# Patient Record
Sex: FEMALE | Race: WHITE | Employment: OTHER | URBAN - METROPOLITAN AREA
[De-identification: names, ages, dates, MRNs, and addresses within clinical notes are randomized per-mention and may not be internally consistent; named-entity substitution may affect disease eponyms.]

---

## 2023-10-10 ENCOUNTER — NURSING HOME VISIT (OUTPATIENT)
Dept: POST ACUTE CARE | Facility: EXTERNAL LOCATION | Age: 83
End: 2023-10-10
Payer: MEDICARE

## 2023-10-10 DIAGNOSIS — I10 BENIGN ESSENTIAL HYPERTENSION: ICD-10-CM

## 2023-10-10 DIAGNOSIS — M15.9 OSTEOARTHRITIS, GENERALIZED: ICD-10-CM

## 2023-10-10 DIAGNOSIS — G30.1 ALZHEIMER'S DISEASE WITH LATE ONSET (MULTI): ICD-10-CM

## 2023-10-10 DIAGNOSIS — F29 PSYCHOSIS, UNSPECIFIED PSYCHOSIS TYPE (MULTI): Primary | ICD-10-CM

## 2023-10-10 DIAGNOSIS — N18.31 STAGE 3A CHRONIC KIDNEY DISEASE (MULTI): ICD-10-CM

## 2023-10-10 DIAGNOSIS — F02.80 ALZHEIMER'S DISEASE WITH LATE ONSET (MULTI): ICD-10-CM

## 2023-10-10 DIAGNOSIS — E78.2 MIXED HYPERLIPIDEMIA: ICD-10-CM

## 2023-10-10 PROCEDURE — 99308 SBSQ NF CARE LOW MDM 20: CPT | Performed by: INTERNAL MEDICINE

## 2023-10-10 NOTE — LETTER
Patient: Sarika Rivero  : 1940    Encounter Date: 10/10/2023    Subjective  Patient ID: Sarika Rivero is a 82 y.o. female who is long term resident being seen and evaluated for multiple medical problems.    Alert, pleasant, denies any pain.  She ambulates dependently.  Appetite okay.  Had no recent falls         Review of Systems   Constitutional:  Negative for fever and unexpected weight change.   HENT:  Negative for sore throat.    Respiratory:  Negative for cough and shortness of breath.    Cardiovascular:  Negative for chest pain and palpitations.   Gastrointestinal:  Negative for abdominal pain, constipation, diarrhea, nausea and vomiting.   Genitourinary:  Negative for difficulty urinating and frequency.   Musculoskeletal:  Positive for arthralgias. Negative for back pain.   Skin:  Negative for rash.   Neurological:  Negative for dizziness, seizures and headaches.   Psychiatric/Behavioral:  Negative for agitation. The patient is not nervous/anxious.        Objective  There were no vitals taken for this visit.    Physical Exam  Vitals reviewed.   Constitutional:       General: She is not in acute distress.  HENT:      Mouth/Throat:      Mouth: Mucous membranes are moist.   Cardiovascular:      Rate and Rhythm: Regular rhythm.      Heart sounds: Normal heart sounds.   Pulmonary:      Breath sounds: Normal breath sounds. No rales.   Abdominal:      Palpations: Abdomen is soft.      Tenderness: There is no abdominal tenderness.   Musculoskeletal:         General: No tenderness.      Cervical back: Neck supple. No tenderness.   Skin:     General: Skin is warm.   Neurological:      General: No focal deficit present.      Mental Status: She is alert.   Psychiatric:         Behavior: Behavior normal.         Assessment/Plan  Problem List Items Addressed This Visit       Psychosis, unspecified psychosis type (CMS/HCC) - Primary    Stage 3a chronic kidney disease (CMS/HCC)    Benign essential  hypertension     We will monitor blood pressure         Mixed hyperlipidemia    Alzheimer's disease with late onset (CMS/HCC)    Osteoarthritis, generalized     We will continue fall precautions             Goals    None           Electronically Signed By: Yadira Sanchez MD   11/6/23  9:02 AM

## 2023-11-06 PROBLEM — I10 BENIGN ESSENTIAL HYPERTENSION: Status: ACTIVE | Noted: 2023-11-06

## 2023-11-06 PROBLEM — M15.9 OSTEOARTHRITIS, GENERALIZED: Status: ACTIVE | Noted: 2023-11-06

## 2023-11-06 PROBLEM — E78.2 MIXED HYPERLIPIDEMIA: Status: ACTIVE | Noted: 2023-11-06

## 2023-11-06 PROBLEM — M54.50 LOW BACK PAIN: Status: ACTIVE | Noted: 2023-11-06

## 2023-11-06 PROBLEM — G30.1 ALZHEIMER'S DISEASE WITH LATE ONSET (MULTI): Status: ACTIVE | Noted: 2023-11-06

## 2023-11-06 PROBLEM — M17.0 PRIMARY OSTEOARTHRITIS OF BOTH KNEES: Status: ACTIVE | Noted: 2023-11-06

## 2023-11-06 PROBLEM — N18.31 STAGE 3A CHRONIC KIDNEY DISEASE (MULTI): Status: ACTIVE | Noted: 2023-11-06

## 2023-11-06 PROBLEM — I25.10 CORONARY ARTERY DISEASE INVOLVING NATIVE CORONARY ARTERY OF NATIVE HEART WITHOUT ANGINA PECTORIS: Status: ACTIVE | Noted: 2023-11-06

## 2023-11-06 PROBLEM — F33.2 MAJOR DEPRESSIVE DISORDER, RECURRENT SEVERE WITHOUT PSYCHOTIC FEATURES (MULTI): Status: ACTIVE | Noted: 2023-11-06

## 2023-11-06 PROBLEM — M85.80 OSTEOPENIA: Status: ACTIVE | Noted: 2023-11-06

## 2023-11-06 PROBLEM — K21.9 ESOPHAGEAL REFLUX: Status: ACTIVE | Noted: 2023-11-06

## 2023-11-06 PROBLEM — E78.5 HYPERLIPIDEMIA: Status: ACTIVE | Noted: 2023-11-06

## 2023-11-06 PROBLEM — F02.80 ALZHEIMER'S DISEASE WITH LATE ONSET (MULTI): Status: ACTIVE | Noted: 2023-11-06

## 2023-11-06 PROBLEM — F29 PSYCHOSIS, UNSPECIFIED PSYCHOSIS TYPE (MULTI): Status: ACTIVE | Noted: 2023-11-06

## 2023-11-06 ASSESSMENT — ENCOUNTER SYMPTOMS
CONSTIPATION: 0
SORE THROAT: 0
UNEXPECTED WEIGHT CHANGE: 0
NERVOUS/ANXIOUS: 0
COUGH: 0
AGITATION: 0
SEIZURES: 0
SHORTNESS OF BREATH: 0
ARTHRALGIAS: 1
DIARRHEA: 0
FEVER: 0
FREQUENCY: 0
VOMITING: 0
ABDOMINAL PAIN: 0
NAUSEA: 0
HEADACHES: 0
BACK PAIN: 0
DIZZINESS: 0
DIFFICULTY URINATING: 0
PALPITATIONS: 0

## 2023-11-06 NOTE — PROGRESS NOTES
Subjective   Patient ID: Sarika Rivero is a 82 y.o. female who is long term resident being seen and evaluated for multiple medical problems.    Alert, pleasant, denies any pain.  She ambulates dependently.  Appetite okay.  Had no recent falls         Review of Systems   Constitutional:  Negative for fever and unexpected weight change.   HENT:  Negative for sore throat.    Respiratory:  Negative for cough and shortness of breath.    Cardiovascular:  Negative for chest pain and palpitations.   Gastrointestinal:  Negative for abdominal pain, constipation, diarrhea, nausea and vomiting.   Genitourinary:  Negative for difficulty urinating and frequency.   Musculoskeletal:  Positive for arthralgias. Negative for back pain.   Skin:  Negative for rash.   Neurological:  Negative for dizziness, seizures and headaches.   Psychiatric/Behavioral:  Negative for agitation. The patient is not nervous/anxious.        Objective   There were no vitals taken for this visit.    Physical Exam  Vitals reviewed.   Constitutional:       General: She is not in acute distress.  HENT:      Mouth/Throat:      Mouth: Mucous membranes are moist.   Cardiovascular:      Rate and Rhythm: Regular rhythm.      Heart sounds: Normal heart sounds.   Pulmonary:      Breath sounds: Normal breath sounds. No rales.   Abdominal:      Palpations: Abdomen is soft.      Tenderness: There is no abdominal tenderness.   Musculoskeletal:         General: No tenderness.      Cervical back: Neck supple. No tenderness.   Skin:     General: Skin is warm.   Neurological:      General: No focal deficit present.      Mental Status: She is alert.   Psychiatric:         Behavior: Behavior normal.         Assessment/Plan   Problem List Items Addressed This Visit       Psychosis, unspecified psychosis type (CMS/HCC) - Primary    Stage 3a chronic kidney disease (CMS/HCC)    Benign essential hypertension     We will monitor blood pressure         Mixed hyperlipidemia     Alzheimer's disease with late onset (CMS/HCC)    Osteoarthritis, generalized     We will continue fall precautions             Goals    None

## 2023-11-07 ENCOUNTER — NURSING HOME VISIT (OUTPATIENT)
Dept: POST ACUTE CARE | Facility: EXTERNAL LOCATION | Age: 83
End: 2023-11-07
Payer: MEDICARE

## 2023-11-07 DIAGNOSIS — I10 BENIGN ESSENTIAL HYPERTENSION: Primary | ICD-10-CM

## 2023-11-07 DIAGNOSIS — N18.31 STAGE 3A CHRONIC KIDNEY DISEASE (MULTI): ICD-10-CM

## 2023-11-07 DIAGNOSIS — F02.80 ALZHEIMER'S DISEASE WITH LATE ONSET (MULTI): ICD-10-CM

## 2023-11-07 DIAGNOSIS — M15.9 OSTEOARTHRITIS, GENERALIZED: ICD-10-CM

## 2023-11-07 DIAGNOSIS — K21.9 GASTROESOPHAGEAL REFLUX DISEASE WITHOUT ESOPHAGITIS: ICD-10-CM

## 2023-11-07 DIAGNOSIS — G30.1 ALZHEIMER'S DISEASE WITH LATE ONSET (MULTI): ICD-10-CM

## 2023-11-07 PROCEDURE — 99308 SBSQ NF CARE LOW MDM 20: CPT | Performed by: INTERNAL MEDICINE

## 2023-11-07 NOTE — LETTER
Patient: Sarika Rivero  : 1940    Encounter Date: 2023    Subjective  Patient ID: Sarika Rivero is a 82 y.o. female who is long term resident being seen and evaluated for multiple medical problems.    Alert, pleasant, forgetful. denies any pain.  She ambulates dependently.  Appetite okay.  Had no recent falls         Review of Systems   Constitutional:  Negative for fever and unexpected weight change.   HENT:  Negative for sore throat.    Respiratory:  Negative for cough and shortness of breath.    Cardiovascular:  Negative for chest pain and palpitations.   Gastrointestinal:  Negative for abdominal pain, constipation, diarrhea, nausea and vomiting.   Genitourinary:  Negative for difficulty urinating and frequency.   Musculoskeletal:  Positive for arthralgias. Negative for back pain.   Skin:  Negative for rash.   Neurological:  Negative for dizziness, seizures and headaches.   Psychiatric/Behavioral:  Negative for agitation. The patient is not nervous/anxious.        Objective  There were no vitals taken for this visit.    Physical Exam  Vitals reviewed.   Constitutional:       General: She is not in acute distress.  HENT:      Mouth/Throat:      Mouth: Mucous membranes are moist.   Cardiovascular:      Rate and Rhythm: Regular rhythm.      Heart sounds: Normal heart sounds.   Pulmonary:      Breath sounds: Normal breath sounds. No rales.   Abdominal:      Palpations: Abdomen is soft.      Tenderness: There is no abdominal tenderness.   Musculoskeletal:         General: No tenderness.      Cervical back: Neck supple. No tenderness.   Neurological:      Mental Status: She is alert.         Assessment/Plan  Problem List Items Addressed This Visit       Stage 3a chronic kidney disease (CMS/HCC)    Benign essential hypertension     Will monitor BP         Esophageal reflux - Primary    Alzheimer's disease with late onset (CMS/HCC)    Osteoarthritis, generalized        Goals    None            Electronically Signed By: Yadira Sanchez MD   1/1/24 10:17 PM

## 2023-12-05 ENCOUNTER — NURSING HOME VISIT (OUTPATIENT)
Dept: POST ACUTE CARE | Facility: EXTERNAL LOCATION | Age: 83
End: 2023-12-05
Payer: MEDICARE

## 2023-12-05 DIAGNOSIS — M15.9 OSTEOARTHRITIS, GENERALIZED: Primary | ICD-10-CM

## 2023-12-05 DIAGNOSIS — G30.1 ALZHEIMER'S DISEASE WITH LATE ONSET (MULTI): ICD-10-CM

## 2023-12-05 DIAGNOSIS — F29 PSYCHOSIS, UNSPECIFIED PSYCHOSIS TYPE (MULTI): ICD-10-CM

## 2023-12-05 DIAGNOSIS — I10 BENIGN ESSENTIAL HYPERTENSION: ICD-10-CM

## 2023-12-05 DIAGNOSIS — F02.80 ALZHEIMER'S DISEASE WITH LATE ONSET (MULTI): ICD-10-CM

## 2023-12-05 PROCEDURE — 99308 SBSQ NF CARE LOW MDM 20: CPT | Performed by: INTERNAL MEDICINE

## 2023-12-05 NOTE — LETTER
Patient: Sarika Rivero  : 1940    Encounter Date: 2023    Subjective  Patient ID: Sarika Rivero is a 83 y.o. female who is long term resident being seen and evaluated for multiple medical problems.    Patient is alert and pleasant, forgetful. Denies any pain. She ambulates independently, had no recent falls. Appetite okay.  She is compliant with medications         Review of Systems   Constitutional:  Negative for fever and unexpected weight change.   HENT:  Negative for sore throat.    Respiratory:  Negative for cough and shortness of breath.    Cardiovascular:  Negative for chest pain and palpitations.   Gastrointestinal:  Negative for abdominal pain, constipation, diarrhea, nausea and vomiting.   Genitourinary:  Negative for difficulty urinating and frequency.   Musculoskeletal:  Positive for arthralgias. Negative for back pain.   Skin:  Negative for rash.   Neurological:  Negative for dizziness, seizures and headaches.   Psychiatric/Behavioral:  Negative for agitation. The patient is not nervous/anxious.        Objective  There were no vitals taken for this visit.    Physical Exam  Vitals reviewed.   Constitutional:       General: She is not in acute distress.  HENT:      Mouth/Throat:      Mouth: Mucous membranes are moist.   Cardiovascular:      Rate and Rhythm: Regular rhythm.      Heart sounds: Normal heart sounds.   Pulmonary:      Breath sounds: Normal breath sounds. No rales.   Abdominal:      Palpations: Abdomen is soft.      Tenderness: There is no abdominal tenderness.   Musculoskeletal:         General: No tenderness.      Cervical back: Neck supple. No tenderness.   Skin:     General: Skin is warm.   Neurological:      General: No focal deficit present.      Mental Status: She is alert.   Psychiatric:         Behavior: Behavior normal.         Assessment/Plan  Problem List Items Addressed This Visit       Psychosis, unspecified psychosis type (CMS/HCC)    Benign essential  hypertension - Primary     Will monitor BP         Alzheimer's disease with late onset (CMS/HCC)    Osteoarthritis, generalized        Goals    None     Will continue fall precautions, supportive care      Electronically Signed By: Yadira Sanchez MD   1/19/24  7:09 AM

## 2023-12-12 ASSESSMENT — ENCOUNTER SYMPTOMS
COUGH: 0
SORE THROAT: 0
DIARRHEA: 0
VOMITING: 0
FEVER: 0
SEIZURES: 0
SHORTNESS OF BREATH: 0
ARTHRALGIAS: 1
AGITATION: 0
BACK PAIN: 0
PALPITATIONS: 0
DIFFICULTY URINATING: 0
UNEXPECTED WEIGHT CHANGE: 0
CONSTIPATION: 0
NAUSEA: 0
NERVOUS/ANXIOUS: 0
HEADACHES: 0
DIZZINESS: 0
FREQUENCY: 0
ABDOMINAL PAIN: 0

## 2023-12-12 NOTE — PROGRESS NOTES
Subjective   Patient ID: Sarika Rivero is a 82 y.o. female who is long term resident being seen and evaluated for multiple medical problems.    Alert, pleasant, forgetful. denies any pain.  She ambulates dependently.  Appetite okay.  Had no recent falls         Review of Systems   Constitutional:  Negative for fever and unexpected weight change.   HENT:  Negative for sore throat.    Respiratory:  Negative for cough and shortness of breath.    Cardiovascular:  Negative for chest pain and palpitations.   Gastrointestinal:  Negative for abdominal pain, constipation, diarrhea, nausea and vomiting.   Genitourinary:  Negative for difficulty urinating and frequency.   Musculoskeletal:  Positive for arthralgias. Negative for back pain.   Skin:  Negative for rash.   Neurological:  Negative for dizziness, seizures and headaches.   Psychiatric/Behavioral:  Negative for agitation. The patient is not nervous/anxious.        Objective   There were no vitals taken for this visit.    Physical Exam  Vitals reviewed.   Constitutional:       General: She is not in acute distress.  HENT:      Mouth/Throat:      Mouth: Mucous membranes are moist.   Cardiovascular:      Rate and Rhythm: Regular rhythm.      Heart sounds: Normal heart sounds.   Pulmonary:      Breath sounds: Normal breath sounds. No rales.   Abdominal:      Palpations: Abdomen is soft.      Tenderness: There is no abdominal tenderness.   Musculoskeletal:         General: No tenderness.      Cervical back: Neck supple. No tenderness.   Neurological:      Mental Status: She is alert.         Assessment/Plan   Problem List Items Addressed This Visit       Stage 3a chronic kidney disease (CMS/HCC)    Benign essential hypertension     Will monitor BP         Esophageal reflux - Primary    Alzheimer's disease with late onset (CMS/HCC)    Osteoarthritis, generalized        Goals    None

## 2023-12-15 ENCOUNTER — NURSING HOME VISIT (OUTPATIENT)
Dept: POST ACUTE CARE | Facility: EXTERNAL LOCATION | Age: 83
End: 2023-12-15
Payer: MEDICARE

## 2023-12-15 DIAGNOSIS — F29 PSYCHOSIS, UNSPECIFIED PSYCHOSIS TYPE (MULTI): ICD-10-CM

## 2023-12-15 DIAGNOSIS — M15.9 OSTEOARTHRITIS, GENERALIZED: ICD-10-CM

## 2023-12-15 DIAGNOSIS — I10 BENIGN ESSENTIAL HYPERTENSION: ICD-10-CM

## 2023-12-15 DIAGNOSIS — K21.9 GASTROESOPHAGEAL REFLUX DISEASE WITHOUT ESOPHAGITIS: ICD-10-CM

## 2023-12-15 DIAGNOSIS — G30.1 ALZHEIMER'S DISEASE WITH LATE ONSET (MULTI): ICD-10-CM

## 2023-12-15 DIAGNOSIS — J20.8 ACUTE BRONCHITIS DUE TO OTHER SPECIFIED ORGANISMS: Primary | ICD-10-CM

## 2023-12-15 DIAGNOSIS — F02.80 ALZHEIMER'S DISEASE WITH LATE ONSET (MULTI): ICD-10-CM

## 2023-12-15 PROCEDURE — 99309 SBSQ NF CARE MODERATE MDM 30: CPT | Performed by: INTERNAL MEDICINE

## 2023-12-15 NOTE — LETTER
Patient: Sarika Rivero  : 1940    Encounter Date: 12/15/2023    Subjective  Patient ID: Sarika Rivero is a 83 y.o. female who is long term resident being seen and evaluated for multiple medical problems.    Patient is alert and pleasant, she is noted to have a productive cough, denies any pain and shortness of breath. She ambulates dependently. Appetite okay.          Review of Systems   Constitutional:  Negative for fever and unexpected weight change.   HENT:  Negative for sore throat.    Respiratory:  Positive for cough. Negative for shortness of breath.    Cardiovascular:  Negative for chest pain and palpitations.   Gastrointestinal:  Negative for abdominal pain, constipation, diarrhea, nausea and vomiting.   Genitourinary:  Negative for difficulty urinating and frequency.   Musculoskeletal:  Positive for arthralgias. Negative for back pain.   Skin:  Negative for rash.   Neurological:  Negative for dizziness, seizures and headaches.   Psychiatric/Behavioral:  Negative for agitation. The patient is not nervous/anxious.        Objective  There were no vitals taken for this visit.    Physical Exam  Vitals reviewed.   Constitutional:       General: She is not in acute distress.  HENT:      Mouth/Throat:      Mouth: Mucous membranes are moist.   Cardiovascular:      Rate and Rhythm: Regular rhythm.      Heart sounds: Normal heart sounds.   Pulmonary:      Breath sounds: Normal breath sounds. No rales.   Abdominal:      Palpations: Abdomen is soft.      Tenderness: There is no abdominal tenderness.   Musculoskeletal:         General: No tenderness.      Cervical back: Neck supple. No tenderness.   Skin:     General: Skin is warm.   Neurological:      General: No focal deficit present.      Mental Status: She is alert.   Psychiatric:         Behavior: Behavior normal.         Assessment/Plan  Problem List Items Addressed This Visit       Psychosis, unspecified psychosis type (CMS/HCC)    Benign  essential hypertension     Will monitor BP         Esophageal reflux    Alzheimer's disease with late onset (CMS/HCC)    Osteoarthritis, generalized     Other Visit Diagnoses       Acute bronchitis due to other specified organisms    -  Primary             Goals    None     Will test for COVID, start Mucinex, monitor temperature      Electronically Signed By: Yadira Sanchez MD   1/22/24  9:04 AM

## 2023-12-27 ASSESSMENT — ENCOUNTER SYMPTOMS
FREQUENCY: 0
DIZZINESS: 0
HEADACHES: 0
NAUSEA: 0
NERVOUS/ANXIOUS: 0
DIZZINESS: 0
VOMITING: 0
PALPITATIONS: 0
SORE THROAT: 0
COUGH: 0
VOMITING: 0
DIARRHEA: 0
DIARRHEA: 0
SHORTNESS OF BREATH: 0
UNEXPECTED WEIGHT CHANGE: 0
SEIZURES: 0
NAUSEA: 0
SHORTNESS OF BREATH: 0
AGITATION: 0
PALPITATIONS: 0
UNEXPECTED WEIGHT CHANGE: 0
BACK PAIN: 0
HEADACHES: 0
DIFFICULTY URINATING: 0
SEIZURES: 0
FREQUENCY: 0
BACK PAIN: 0
ABDOMINAL PAIN: 0
SORE THROAT: 0
FEVER: 0
ARTHRALGIAS: 1
FEVER: 0
ABDOMINAL PAIN: 0
CONSTIPATION: 0
NERVOUS/ANXIOUS: 0
ARTHRALGIAS: 1
CONSTIPATION: 0
DIFFICULTY URINATING: 0
AGITATION: 0

## 2023-12-27 NOTE — PROGRESS NOTES
Subjective   Patient ID: Sarika Rivero is a 83 y.o. female who is long term resident being seen and evaluated for multiple medical problems.    Patient is alert and pleasant, forgetful. Denies any pain. She ambulates independently, had no recent falls. Appetite okay.  She is compliant with medications         Review of Systems   Constitutional:  Negative for fever and unexpected weight change.   HENT:  Negative for sore throat.    Respiratory:  Negative for cough and shortness of breath.    Cardiovascular:  Negative for chest pain and palpitations.   Gastrointestinal:  Negative for abdominal pain, constipation, diarrhea, nausea and vomiting.   Genitourinary:  Negative for difficulty urinating and frequency.   Musculoskeletal:  Positive for arthralgias. Negative for back pain.   Skin:  Negative for rash.   Neurological:  Negative for dizziness, seizures and headaches.   Psychiatric/Behavioral:  Negative for agitation. The patient is not nervous/anxious.        Objective   There were no vitals taken for this visit.    Physical Exam  Vitals reviewed.   Constitutional:       General: She is not in acute distress.  HENT:      Mouth/Throat:      Mouth: Mucous membranes are moist.   Cardiovascular:      Rate and Rhythm: Regular rhythm.      Heart sounds: Normal heart sounds.   Pulmonary:      Breath sounds: Normal breath sounds. No rales.   Abdominal:      Palpations: Abdomen is soft.      Tenderness: There is no abdominal tenderness.   Musculoskeletal:         General: No tenderness.      Cervical back: Neck supple. No tenderness.   Skin:     General: Skin is warm.   Neurological:      General: No focal deficit present.      Mental Status: She is alert.   Psychiatric:         Behavior: Behavior normal.         Assessment/Plan   Problem List Items Addressed This Visit       Psychosis, unspecified psychosis type (CMS/HCC)    Benign essential hypertension - Primary     Will monitor BP         Alzheimer's disease with  late onset (CMS/[facility])    Osteoarthritis, generalized        Goals    None     Will continue fall precautions, supportive care

## 2023-12-27 NOTE — PROGRESS NOTES
Subjective   Patient ID: Sarika Rivero is a 83 y.o. female who is long term resident being seen and evaluated for multiple medical problems.    Patient is alert and pleasant, she is noted to have a productive cough, denies any pain and shortness of breath. She ambulates dependently. Appetite okay.          Review of Systems   Constitutional:  Negative for fever and unexpected weight change.   HENT:  Negative for sore throat.    Respiratory:  Positive for cough. Negative for shortness of breath.    Cardiovascular:  Negative for chest pain and palpitations.   Gastrointestinal:  Negative for abdominal pain, constipation, diarrhea, nausea and vomiting.   Genitourinary:  Negative for difficulty urinating and frequency.   Musculoskeletal:  Positive for arthralgias. Negative for back pain.   Skin:  Negative for rash.   Neurological:  Negative for dizziness, seizures and headaches.   Psychiatric/Behavioral:  Negative for agitation. The patient is not nervous/anxious.        Objective   There were no vitals taken for this visit.    Physical Exam  Vitals reviewed.   Constitutional:       General: She is not in acute distress.  HENT:      Mouth/Throat:      Mouth: Mucous membranes are moist.   Cardiovascular:      Rate and Rhythm: Regular rhythm.      Heart sounds: Normal heart sounds.   Pulmonary:      Breath sounds: Normal breath sounds. No rales.   Abdominal:      Palpations: Abdomen is soft.      Tenderness: There is no abdominal tenderness.   Musculoskeletal:         General: No tenderness.      Cervical back: Neck supple. No tenderness.   Skin:     General: Skin is warm.   Neurological:      General: No focal deficit present.      Mental Status: She is alert.   Psychiatric:         Behavior: Behavior normal.         Assessment/Plan   Problem List Items Addressed This Visit       Psychosis, unspecified psychosis type (CMS/HCC)    Benign essential hypertension     Will monitor BP         Esophageal reflux     Alzheimer's disease with late onset (CMS/[facility])    Osteoarthritis, generalized     Other Visit Diagnoses       Acute bronchitis due to other specified organisms    -  Primary             Goals    None     Will test for COVID, start Mucinex, monitor temperature

## 2024-01-16 ENCOUNTER — NURSING HOME VISIT (OUTPATIENT)
Dept: POST ACUTE CARE | Facility: EXTERNAL LOCATION | Age: 84
End: 2024-01-16
Payer: MEDICARE

## 2024-01-16 DIAGNOSIS — F02.80 ALZHEIMER'S DISEASE WITH LATE ONSET (MULTI): ICD-10-CM

## 2024-01-16 DIAGNOSIS — F29 PSYCHOSIS, UNSPECIFIED PSYCHOSIS TYPE (MULTI): ICD-10-CM

## 2024-01-16 DIAGNOSIS — G30.1 ALZHEIMER'S DISEASE WITH LATE ONSET (MULTI): ICD-10-CM

## 2024-01-16 DIAGNOSIS — I10 BENIGN ESSENTIAL HYPERTENSION: ICD-10-CM

## 2024-01-16 DIAGNOSIS — M15.9 OSTEOARTHRITIS, GENERALIZED: Primary | ICD-10-CM

## 2024-01-16 PROCEDURE — 99308 SBSQ NF CARE LOW MDM 20: CPT | Performed by: INTERNAL MEDICINE

## 2024-01-16 NOTE — LETTER
Patient: Sarika Rivero  : 1940    Encounter Date: 2024    Subjective  Patient ID: Sarika Rivero is a 83 y.o. female who is long term resident being seen and evaluated for multiple medical problems.    Patient is alert and pleasant, denies any pain and shortness of breath. She ambulates with walker, had no recent falls. Appetite okay.          Review of Systems   Constitutional:  Negative for fever and unexpected weight change.   HENT:  Negative for sore throat.    Respiratory:  Negative for cough and shortness of breath.    Cardiovascular:  Negative for chest pain and palpitations.   Gastrointestinal:  Negative for abdominal pain, constipation, diarrhea, nausea and vomiting.   Genitourinary:  Negative for difficulty urinating and frequency.   Musculoskeletal:  Positive for arthralgias. Negative for back pain.   Skin:  Negative for rash.   Neurological:  Negative for dizziness, seizures and headaches.   Psychiatric/Behavioral:  Negative for agitation. The patient is not nervous/anxious.        Objective  There were no vitals taken for this visit.    Physical Exam  Vitals reviewed.   Constitutional:       General: She is not in acute distress.  HENT:      Mouth/Throat:      Mouth: Mucous membranes are moist.   Cardiovascular:      Rate and Rhythm: Regular rhythm.      Heart sounds: Normal heart sounds.   Pulmonary:      Breath sounds: Normal breath sounds. No rales.   Abdominal:      Palpations: Abdomen is soft.      Tenderness: There is no abdominal tenderness.   Musculoskeletal:         General: No tenderness.      Cervical back: Neck supple. No tenderness.   Skin:     General: Skin is warm.   Neurological:      General: No focal deficit present.      Mental Status: She is alert.   Psychiatric:         Behavior: Behavior normal.         Cognition and Memory: Memory is impaired.         Assessment/Plan  Problem List Items Addressed This Visit       Psychosis, unspecified psychosis type  (CMS/HCC)    Benign essential hypertension     Will monitor BP         Alzheimer's disease with late onset (CMS/HCC)    Osteoarthritis, generalized - Primary        Goals    None     Continue fall precautions      Electronically Signed By: Yadira Sanchez MD   3/18/24  8:37 AM

## 2024-01-22 ASSESSMENT — ENCOUNTER SYMPTOMS: COUGH: 1

## 2024-02-06 ENCOUNTER — NURSING HOME VISIT (OUTPATIENT)
Dept: POST ACUTE CARE | Facility: EXTERNAL LOCATION | Age: 84
End: 2024-02-06
Payer: MEDICARE

## 2024-02-06 DIAGNOSIS — G30.1 ALZHEIMER'S DISEASE WITH LATE ONSET (MULTI): ICD-10-CM

## 2024-02-06 DIAGNOSIS — F02.80 ALZHEIMER'S DISEASE WITH LATE ONSET (MULTI): ICD-10-CM

## 2024-02-06 DIAGNOSIS — I10 BENIGN ESSENTIAL HYPERTENSION: Primary | ICD-10-CM

## 2024-02-06 DIAGNOSIS — F29 PSYCHOSIS, UNSPECIFIED PSYCHOSIS TYPE (MULTI): ICD-10-CM

## 2024-02-06 DIAGNOSIS — M15.9 OSTEOARTHRITIS, GENERALIZED: ICD-10-CM

## 2024-02-06 PROCEDURE — 99308 SBSQ NF CARE LOW MDM 20: CPT | Performed by: INTERNAL MEDICINE

## 2024-02-06 NOTE — LETTER
Patient: Sarika Rivero  : 1940    Encounter Date: 2024    Subjective  Patient ID: Sarika Rivero is a 83 y.o. female who is long term resident being seen and evaluated for multiple medical problems.    Alert and pleasant, no respiratory distress noted. She ambulates with walker, no she is compliant with medication recent falls. Appetite okay.          Review of Systems   Constitutional:  Negative for fever and unexpected weight change.   HENT:  Negative for sore throat.    Respiratory:  Negative for cough and shortness of breath.    Cardiovascular:  Negative for chest pain and palpitations.   Gastrointestinal:  Negative for abdominal pain, constipation, diarrhea, nausea and vomiting.   Genitourinary:  Negative for difficulty urinating and frequency.   Musculoskeletal:  Positive for arthralgias. Negative for back pain.   Skin:  Negative for rash.   Neurological:  Negative for dizziness, seizures and headaches.   Psychiatric/Behavioral:  Negative for agitation. The patient is not nervous/anxious.        Objective  There were no vitals taken for this visit.    Physical Exam  Vitals reviewed.   Constitutional:       General: She is not in acute distress.  HENT:      Mouth/Throat:      Mouth: Mucous membranes are moist.   Cardiovascular:      Rate and Rhythm: Regular rhythm.      Heart sounds: Normal heart sounds.   Pulmonary:      Breath sounds: Normal breath sounds. No rales.   Abdominal:      Palpations: Abdomen is soft.      Tenderness: There is no abdominal tenderness.   Musculoskeletal:         General: No tenderness.      Cervical back: Neck supple. No tenderness.   Skin:     General: Skin is warm.   Neurological:      General: No focal deficit present.      Mental Status: She is alert.   Psychiatric:         Behavior: Behavior normal.         Cognition and Memory: Memory is impaired.         Assessment/Plan  Problem List Items Addressed This Visit       Psychosis, unspecified psychosis type  (CMS/HCC)    Benign essential hypertension - Primary     Will monitor BP         Alzheimer's disease with late onset (CMS/[facility])    Osteoarthritis, generalized        Goals    None     Will continue fall precautions      Electronically Signed By: Yadira Sanchez MD   3/18/24  8:38 AM

## 2024-02-12 ASSESSMENT — ENCOUNTER SYMPTOMS
HEADACHES: 0
NAUSEA: 0
CONSTIPATION: 0
ARTHRALGIAS: 1
DIFFICULTY URINATING: 0
UNEXPECTED WEIGHT CHANGE: 0
SORE THROAT: 0
AGITATION: 0
DIZZINESS: 0
FEVER: 0
FREQUENCY: 0
DIARRHEA: 0
ABDOMINAL PAIN: 0
COUGH: 0
SHORTNESS OF BREATH: 0
NERVOUS/ANXIOUS: 0
PALPITATIONS: 0
SEIZURES: 0
VOMITING: 0
BACK PAIN: 0

## 2024-02-12 NOTE — PROGRESS NOTES
Subjective   Patient ID: Sarika Rivero is a 83 y.o. female who is long term resident being seen and evaluated for multiple medical problems.    Patient is alert and pleasant, denies any pain and shortness of breath. She ambulates with walker, had no recent falls. Appetite okay.          Review of Systems   Constitutional:  Negative for fever and unexpected weight change.   HENT:  Negative for sore throat.    Respiratory:  Negative for cough and shortness of breath.    Cardiovascular:  Negative for chest pain and palpitations.   Gastrointestinal:  Negative for abdominal pain, constipation, diarrhea, nausea and vomiting.   Genitourinary:  Negative for difficulty urinating and frequency.   Musculoskeletal:  Positive for arthralgias. Negative for back pain.   Skin:  Negative for rash.   Neurological:  Negative for dizziness, seizures and headaches.   Psychiatric/Behavioral:  Negative for agitation. The patient is not nervous/anxious.        Objective   There were no vitals taken for this visit.    Physical Exam  Vitals reviewed.   Constitutional:       General: She is not in acute distress.  HENT:      Mouth/Throat:      Mouth: Mucous membranes are moist.   Cardiovascular:      Rate and Rhythm: Regular rhythm.      Heart sounds: Normal heart sounds.   Pulmonary:      Breath sounds: Normal breath sounds. No rales.   Abdominal:      Palpations: Abdomen is soft.      Tenderness: There is no abdominal tenderness.   Musculoskeletal:         General: No tenderness.      Cervical back: Neck supple. No tenderness.   Skin:     General: Skin is warm.   Neurological:      General: No focal deficit present.      Mental Status: She is alert.   Psychiatric:         Behavior: Behavior normal.         Cognition and Memory: Memory is impaired.         Assessment/Plan   Problem List Items Addressed This Visit       Psychosis, unspecified psychosis type (CMS/HCC)    Benign essential hypertension     Will monitor BP         Alzheimer's  disease with late onset (CMS/HCC)    Osteoarthritis, generalized - Primary        Goals    None     Continue fall precautions

## 2024-02-20 ASSESSMENT — ENCOUNTER SYMPTOMS
ABDOMINAL PAIN: 0
DIFFICULTY URINATING: 0
DIARRHEA: 0
SEIZURES: 0
CONSTIPATION: 0
UNEXPECTED WEIGHT CHANGE: 0
PALPITATIONS: 0
VOMITING: 0
SORE THROAT: 0
NERVOUS/ANXIOUS: 0
FEVER: 0
SHORTNESS OF BREATH: 0
DIZZINESS: 0
AGITATION: 0
BACK PAIN: 0
ARTHRALGIAS: 1
HEADACHES: 0
FREQUENCY: 0
COUGH: 0
NAUSEA: 0

## 2024-02-20 NOTE — PROGRESS NOTES
Subjective   Patient ID: Sarika Rivero is a 83 y.o. female who is long term resident being seen and evaluated for multiple medical problems.    Alert and pleasant, no respiratory distress noted. She ambulates with walker, no she is compliant with medication recent falls. Appetite okay.          Review of Systems   Constitutional:  Negative for fever and unexpected weight change.   HENT:  Negative for sore throat.    Respiratory:  Negative for cough and shortness of breath.    Cardiovascular:  Negative for chest pain and palpitations.   Gastrointestinal:  Negative for abdominal pain, constipation, diarrhea, nausea and vomiting.   Genitourinary:  Negative for difficulty urinating and frequency.   Musculoskeletal:  Positive for arthralgias. Negative for back pain.   Skin:  Negative for rash.   Neurological:  Negative for dizziness, seizures and headaches.   Psychiatric/Behavioral:  Negative for agitation. The patient is not nervous/anxious.        Objective   There were no vitals taken for this visit.    Physical Exam  Vitals reviewed.   Constitutional:       General: She is not in acute distress.  HENT:      Mouth/Throat:      Mouth: Mucous membranes are moist.   Cardiovascular:      Rate and Rhythm: Regular rhythm.      Heart sounds: Normal heart sounds.   Pulmonary:      Breath sounds: Normal breath sounds. No rales.   Abdominal:      Palpations: Abdomen is soft.      Tenderness: There is no abdominal tenderness.   Musculoskeletal:         General: No tenderness.      Cervical back: Neck supple. No tenderness.   Skin:     General: Skin is warm.   Neurological:      General: No focal deficit present.      Mental Status: She is alert.   Psychiatric:         Behavior: Behavior normal.         Cognition and Memory: Memory is impaired.         Assessment/Plan   Problem List Items Addressed This Visit       Psychosis, unspecified psychosis type (CMS/HCC)    Benign essential hypertension - Primary     Will monitor BP          Alzheimer's disease with late onset (CMS/HCC)    Osteoarthritis, generalized        Goals    None     Will continue fall precautions

## 2024-03-05 ENCOUNTER — NURSING HOME VISIT (OUTPATIENT)
Dept: POST ACUTE CARE | Facility: EXTERNAL LOCATION | Age: 84
End: 2024-03-05
Payer: MEDICARE

## 2024-03-05 DIAGNOSIS — I10 BENIGN ESSENTIAL HYPERTENSION: ICD-10-CM

## 2024-03-05 DIAGNOSIS — G30.1 ALZHEIMER'S DISEASE WITH LATE ONSET (MULTI): ICD-10-CM

## 2024-03-05 DIAGNOSIS — K21.9 GASTROESOPHAGEAL REFLUX DISEASE WITHOUT ESOPHAGITIS: Primary | ICD-10-CM

## 2024-03-05 DIAGNOSIS — F02.80 ALZHEIMER'S DISEASE WITH LATE ONSET (MULTI): ICD-10-CM

## 2024-03-05 DIAGNOSIS — F33.2 MAJOR DEPRESSIVE DISORDER, RECURRENT SEVERE WITHOUT PSYCHOTIC FEATURES (MULTI): ICD-10-CM

## 2024-03-05 DIAGNOSIS — M15.9 OSTEOARTHRITIS, GENERALIZED: ICD-10-CM

## 2024-03-05 PROCEDURE — 99308 SBSQ NF CARE LOW MDM 20: CPT | Performed by: INTERNAL MEDICINE

## 2024-03-05 NOTE — LETTER
Patient: Sarika Rivero  : 1940    Encounter Date: 2024    Subjective  Patient ID: Sarika Rivero is a 83 y.o. female who is long term resident being seen and evaluated for multiple medical problems.    Alert and pleasant. Denies pain, no respiratory distress noted. She ambulates with walker, no she is compliant with medication recent falls. Appetite okay.          Review of Systems   Constitutional:  Negative for fever and unexpected weight change.   HENT:  Negative for sore throat.    Respiratory:  Negative for cough and shortness of breath.    Cardiovascular:  Negative for chest pain and palpitations.   Gastrointestinal:  Negative for abdominal pain, constipation, diarrhea, nausea and vomiting.   Genitourinary:  Negative for difficulty urinating and frequency.   Musculoskeletal:  Positive for arthralgias. Negative for back pain.   Skin:  Negative for rash.   Neurological:  Negative for dizziness, seizures and headaches.   Psychiatric/Behavioral:  Negative for agitation. The patient is not nervous/anxious.        Objective  There were no vitals taken for this visit.    Physical Exam  Vitals reviewed.   Constitutional:       General: She is not in acute distress.  HENT:      Mouth/Throat:      Mouth: Mucous membranes are moist.   Cardiovascular:      Rate and Rhythm: Regular rhythm.      Heart sounds: Normal heart sounds.   Pulmonary:      Breath sounds: Normal breath sounds. No rales.   Abdominal:      Palpations: Abdomen is soft.      Tenderness: There is no abdominal tenderness.   Musculoskeletal:         General: No tenderness.      Cervical back: Neck supple. No tenderness.   Skin:     General: Skin is warm.   Neurological:      General: No focal deficit present.      Mental Status: She is alert.   Psychiatric:         Behavior: Behavior normal.         Cognition and Memory: Memory is impaired.         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

## 2024-03-25 ASSESSMENT — ENCOUNTER SYMPTOMS
FREQUENCY: 0
UNEXPECTED WEIGHT CHANGE: 0
BACK PAIN: 0
HEADACHES: 0
ABDOMINAL PAIN: 0
NAUSEA: 0
SORE THROAT: 0
PALPITATIONS: 0
ARTHRALGIAS: 1
DIFFICULTY URINATING: 0
FEVER: 0
DIZZINESS: 0
SHORTNESS OF BREATH: 0
COUGH: 0
SEIZURES: 0
NERVOUS/ANXIOUS: 0
VOMITING: 0
AGITATION: 0
DIARRHEA: 0
CONSTIPATION: 0

## 2024-03-25 NOTE — PROGRESS NOTES
Subjective   Patient ID: Sarika Rivero is a 83 y.o. female who is long term resident being seen and evaluated for multiple medical problems.    Alert and pleasant. Denies pain, no respiratory distress noted. She ambulates with walker, no she is compliant with medication recent falls. Appetite okay.          Review of Systems   Constitutional:  Negative for fever and unexpected weight change.   HENT:  Negative for sore throat.    Respiratory:  Negative for cough and shortness of breath.    Cardiovascular:  Negative for chest pain and palpitations.   Gastrointestinal:  Negative for abdominal pain, constipation, diarrhea, nausea and vomiting.   Genitourinary:  Negative for difficulty urinating and frequency.   Musculoskeletal:  Positive for arthralgias. Negative for back pain.   Skin:  Negative for rash.   Neurological:  Negative for dizziness, seizures and headaches.   Psychiatric/Behavioral:  Negative for agitation. The patient is not nervous/anxious.        Objective   There were no vitals taken for this visit.    Physical Exam  Vitals reviewed.   Constitutional:       General: She is not in acute distress.  HENT:      Mouth/Throat:      Mouth: Mucous membranes are moist.   Cardiovascular:      Rate and Rhythm: Regular rhythm.      Heart sounds: Normal heart sounds.   Pulmonary:      Breath sounds: Normal breath sounds. No rales.   Abdominal:      Palpations: Abdomen is soft.      Tenderness: There is no abdominal tenderness.   Musculoskeletal:         General: No tenderness.      Cervical back: Neck supple. No tenderness.   Skin:     General: Skin is warm.   Neurological:      General: No focal deficit present.      Mental Status: She is alert.   Psychiatric:         Behavior: Behavior normal.         Cognition and Memory: Memory is impaired.         Assessment/Plan   Problem List Items Addressed This Visit       Benign essential hypertension     Will monitor BP         Esophageal reflux - Primary    Major  depressive disorder, recurrent severe without psychotic features (CMS/HCC)    Alzheimer's disease with late onset (CMS/HCC)    Osteoarthritis, generalized        Goals    None     Will continue fall precautions

## 2024-04-02 ENCOUNTER — NURSING HOME VISIT (OUTPATIENT)
Dept: POST ACUTE CARE | Facility: EXTERNAL LOCATION | Age: 84
End: 2024-04-02
Payer: MEDICARE

## 2024-04-02 DIAGNOSIS — G30.1 ALZHEIMER'S DISEASE WITH LATE ONSET (MULTI): ICD-10-CM

## 2024-04-02 DIAGNOSIS — M15.9 OSTEOARTHRITIS, GENERALIZED: ICD-10-CM

## 2024-04-02 DIAGNOSIS — F02.80 ALZHEIMER'S DISEASE WITH LATE ONSET (MULTI): ICD-10-CM

## 2024-04-02 DIAGNOSIS — K21.9 GASTROESOPHAGEAL REFLUX DISEASE WITHOUT ESOPHAGITIS: ICD-10-CM

## 2024-04-02 DIAGNOSIS — I10 BENIGN ESSENTIAL HYPERTENSION: Primary | ICD-10-CM

## 2024-04-02 PROCEDURE — 99308 SBSQ NF CARE LOW MDM 20: CPT | Performed by: INTERNAL MEDICINE

## 2024-04-02 NOTE — LETTER
Patient: Sarika Rivero  : 1940    Encounter Date: 2024    Subjective  Patient ID: Sarika Rivero is a 83 y.o. female who is long term resident being seen and evaluated for multiple medical problems.    Alert and pleasant. Denies pain and shortness of breath. She ambulates short distances with walker. Denies recent falls. She is compliant with medication.         Review of Systems   Constitutional:  Negative for fever and unexpected weight change.   HENT:  Negative for sore throat.    Respiratory:  Negative for cough and shortness of breath.    Cardiovascular:  Negative for chest pain and palpitations.   Gastrointestinal:  Negative for abdominal pain, constipation, diarrhea, nausea and vomiting.   Genitourinary:  Negative for difficulty urinating and frequency.   Musculoskeletal:  Positive for arthralgias. Negative for back pain.   Skin:  Negative for rash.   Neurological:  Negative for dizziness, seizures and headaches.   Psychiatric/Behavioral:  Negative for agitation. The patient is not nervous/anxious.        Objective  There were no vitals taken for this visit.    Physical Exam  Vitals reviewed.   Constitutional:       General: She is not in acute distress.  HENT:      Mouth/Throat:      Mouth: Mucous membranes are moist.   Cardiovascular:      Rate and Rhythm: Regular rhythm.      Heart sounds: Normal heart sounds.   Pulmonary:      Breath sounds: Normal breath sounds. No rales.   Abdominal:      Palpations: Abdomen is soft.      Tenderness: There is no abdominal tenderness.   Musculoskeletal:         General: No tenderness.      Cervical back: Neck supple. No tenderness.   Skin:     General: Skin is warm.   Neurological:      Mental Status: She is alert.         Assessment/Plan  Problem List Items Addressed This Visit       Benign essential hypertension - Primary     Will monitor BP         Esophageal reflux    Alzheimer's disease with late onset (Multi)    Osteoarthritis, generalized         Goals    None     Will continue fall precautions      Electronically Signed By: Yadira Sanchez MD   5/19/24  8:04 PM

## 2024-04-16 ASSESSMENT — ENCOUNTER SYMPTOMS
COUGH: 0
AGITATION: 0
FREQUENCY: 0
ABDOMINAL PAIN: 0
ARTHRALGIAS: 1
SORE THROAT: 0
FEVER: 0
NERVOUS/ANXIOUS: 0
CONSTIPATION: 0
HEADACHES: 0
UNEXPECTED WEIGHT CHANGE: 0
NAUSEA: 0
SHORTNESS OF BREATH: 0
SEIZURES: 0
DIARRHEA: 0
VOMITING: 0
DIZZINESS: 0
DIFFICULTY URINATING: 0
BACK PAIN: 0
PALPITATIONS: 0

## 2024-04-16 NOTE — PROGRESS NOTES
Subjective   Patient ID: Sarika Rivero is a 83 y.o. female who is long term resident being seen and evaluated for multiple medical problems.    Alert and pleasant. Denies pain and shortness of breath. She ambulates short distances with walker. Denies recent falls. She is compliant with medication.         Review of Systems   Constitutional:  Negative for fever and unexpected weight change.   HENT:  Negative for sore throat.    Respiratory:  Negative for cough and shortness of breath.    Cardiovascular:  Negative for chest pain and palpitations.   Gastrointestinal:  Negative for abdominal pain, constipation, diarrhea, nausea and vomiting.   Genitourinary:  Negative for difficulty urinating and frequency.   Musculoskeletal:  Positive for arthralgias. Negative for back pain.   Skin:  Negative for rash.   Neurological:  Negative for dizziness, seizures and headaches.   Psychiatric/Behavioral:  Negative for agitation. The patient is not nervous/anxious.        Objective   There were no vitals taken for this visit.    Physical Exam  Vitals reviewed.   Constitutional:       General: She is not in acute distress.  HENT:      Mouth/Throat:      Mouth: Mucous membranes are moist.   Cardiovascular:      Rate and Rhythm: Regular rhythm.      Heart sounds: Normal heart sounds.   Pulmonary:      Breath sounds: Normal breath sounds. No rales.   Abdominal:      Palpations: Abdomen is soft.      Tenderness: There is no abdominal tenderness.   Musculoskeletal:         General: No tenderness.      Cervical back: Neck supple. No tenderness.   Skin:     General: Skin is warm.   Neurological:      Mental Status: She is alert.         Assessment/Plan   Problem List Items Addressed This Visit       Benign essential hypertension - Primary     Will monitor BP         Esophageal reflux    Alzheimer's disease with late onset (Multi)    Osteoarthritis, generalized        Goals    None     Will continue fall precautions

## 2024-05-07 ENCOUNTER — NURSING HOME VISIT (OUTPATIENT)
Dept: POST ACUTE CARE | Facility: EXTERNAL LOCATION | Age: 84
End: 2024-05-07
Payer: MEDICARE

## 2024-05-07 DIAGNOSIS — M15.9 OSTEOARTHRITIS, GENERALIZED: Primary | ICD-10-CM

## 2024-05-07 DIAGNOSIS — I10 BENIGN ESSENTIAL HYPERTENSION: ICD-10-CM

## 2024-05-07 DIAGNOSIS — G30.1 ALZHEIMER'S DISEASE WITH LATE ONSET (MULTI): ICD-10-CM

## 2024-05-07 DIAGNOSIS — F02.80 ALZHEIMER'S DISEASE WITH LATE ONSET (MULTI): ICD-10-CM

## 2024-05-07 PROCEDURE — 99308 SBSQ NF CARE LOW MDM 20: CPT | Performed by: INTERNAL MEDICINE

## 2024-05-07 NOTE — LETTER
Patient: Sarika Rivero  : 1940    Encounter Date: 2024    Subjective  Patient ID: Sarika Rivero is a 83 y.o. female who is long term resident being seen and evaluated for multiple medical problems.    Alert and pleasant. Denies pain. She ambulates short distances with walker. Denies recent falls. She is compliant with medication.         Review of Systems   Constitutional:  Negative for fever and unexpected weight change.   HENT:  Negative for sore throat.    Respiratory:  Negative for cough and shortness of breath.    Cardiovascular:  Negative for chest pain and palpitations.   Gastrointestinal:  Negative for abdominal pain, constipation, diarrhea, nausea and vomiting.   Genitourinary:  Negative for difficulty urinating and frequency.   Musculoskeletal:  Positive for arthralgias. Negative for back pain.   Skin:  Negative for rash.   Neurological:  Negative for dizziness, seizures and headaches.   Psychiatric/Behavioral:  Negative for agitation. The patient is not nervous/anxious.        Objective  There were no vitals taken for this visit.    Physical Exam  Vitals reviewed.   Constitutional:       General: She is not in acute distress.  HENT:      Mouth/Throat:      Mouth: Mucous membranes are moist.   Cardiovascular:      Rate and Rhythm: Regular rhythm.      Heart sounds: Normal heart sounds.   Pulmonary:      Breath sounds: Normal breath sounds. No rales.   Abdominal:      Palpations: Abdomen is soft.      Tenderness: There is no abdominal tenderness.   Musculoskeletal:         General: No tenderness.      Cervical back: Neck supple. No tenderness.   Skin:     General: Skin is warm.   Neurological:      General: No focal deficit present.      Mental Status: She is alert.   Psychiatric:         Behavior: Behavior normal.         Assessment/Plan  Problem List Items Addressed This Visit       Benign essential hypertension     Will monitor BP         Alzheimer's disease with late onset (Multi)     Osteoarthritis, generalized - Primary        Goals    None     Will continue fall precautions      Electronically Signed By: Yadira Sanchez MD   5/19/24  9:11 PM

## 2024-05-14 ASSESSMENT — ENCOUNTER SYMPTOMS
FREQUENCY: 0
UNEXPECTED WEIGHT CHANGE: 0
ARTHRALGIAS: 1
SORE THROAT: 0
FEVER: 0
ABDOMINAL PAIN: 0
AGITATION: 0
DIFFICULTY URINATING: 0
SHORTNESS OF BREATH: 0
DIZZINESS: 0
VOMITING: 0
NERVOUS/ANXIOUS: 0
HEADACHES: 0
COUGH: 0
CONSTIPATION: 0
SEIZURES: 0
BACK PAIN: 0
NAUSEA: 0
PALPITATIONS: 0
DIARRHEA: 0

## 2024-05-14 NOTE — PROGRESS NOTES
Subjective   Patient ID: Sarika Rivero is a 83 y.o. female who is long term resident being seen and evaluated for multiple medical problems.    Alert and pleasant. Denies pain. She ambulates short distances with walker. Denies recent falls. She is compliant with medication.         Review of Systems   Constitutional:  Negative for fever and unexpected weight change.   HENT:  Negative for sore throat.    Respiratory:  Negative for cough and shortness of breath.    Cardiovascular:  Negative for chest pain and palpitations.   Gastrointestinal:  Negative for abdominal pain, constipation, diarrhea, nausea and vomiting.   Genitourinary:  Negative for difficulty urinating and frequency.   Musculoskeletal:  Positive for arthralgias. Negative for back pain.   Skin:  Negative for rash.   Neurological:  Negative for dizziness, seizures and headaches.   Psychiatric/Behavioral:  Negative for agitation. The patient is not nervous/anxious.        Objective   There were no vitals taken for this visit.    Physical Exam  Vitals reviewed.   Constitutional:       General: She is not in acute distress.  HENT:      Mouth/Throat:      Mouth: Mucous membranes are moist.   Cardiovascular:      Rate and Rhythm: Regular rhythm.      Heart sounds: Normal heart sounds.   Pulmonary:      Breath sounds: Normal breath sounds. No rales.   Abdominal:      Palpations: Abdomen is soft.      Tenderness: There is no abdominal tenderness.   Musculoskeletal:         General: No tenderness.      Cervical back: Neck supple. No tenderness.   Skin:     General: Skin is warm.   Neurological:      General: No focal deficit present.      Mental Status: She is alert.   Psychiatric:         Behavior: Behavior normal.         Assessment/Plan   Problem List Items Addressed This Visit       Benign essential hypertension     Will monitor BP         Alzheimer's disease with late onset (Multi)    Osteoarthritis, generalized - Primary        Goals    None     Will  continue fall precautions

## 2024-06-04 ENCOUNTER — NURSING HOME VISIT (OUTPATIENT)
Dept: POST ACUTE CARE | Facility: EXTERNAL LOCATION | Age: 84
End: 2024-06-04
Payer: MEDICARE

## 2024-06-04 DIAGNOSIS — M15.9 OSTEOARTHRITIS, GENERALIZED: Primary | ICD-10-CM

## 2024-06-04 DIAGNOSIS — F02.80 ALZHEIMER'S DISEASE WITH LATE ONSET (MULTI): ICD-10-CM

## 2024-06-04 DIAGNOSIS — K21.9 GASTROESOPHAGEAL REFLUX DISEASE WITHOUT ESOPHAGITIS: ICD-10-CM

## 2024-06-04 DIAGNOSIS — G30.1 ALZHEIMER'S DISEASE WITH LATE ONSET (MULTI): ICD-10-CM

## 2024-06-04 PROCEDURE — 99308 SBSQ NF CARE LOW MDM 20: CPT | Performed by: INTERNAL MEDICINE

## 2024-07-09 ENCOUNTER — NURSING HOME VISIT (OUTPATIENT)
Dept: POST ACUTE CARE | Facility: EXTERNAL LOCATION | Age: 84
End: 2024-07-09
Payer: MEDICARE

## 2024-07-09 DIAGNOSIS — G30.1 ALZHEIMER'S DISEASE WITH LATE ONSET (MULTI): ICD-10-CM

## 2024-07-09 DIAGNOSIS — F02.80 ALZHEIMER'S DISEASE WITH LATE ONSET (MULTI): ICD-10-CM

## 2024-07-09 DIAGNOSIS — M15.9 OSTEOARTHRITIS, GENERALIZED: ICD-10-CM

## 2024-07-09 DIAGNOSIS — L20.84 INTRINSIC ECZEMA: Primary | ICD-10-CM

## 2024-07-09 DIAGNOSIS — I10 BENIGN ESSENTIAL HYPERTENSION: ICD-10-CM

## 2024-07-09 PROCEDURE — 99308 SBSQ NF CARE LOW MDM 20: CPT | Performed by: INTERNAL MEDICINE

## 2024-07-09 NOTE — LETTER
Patient: Sarika Rivero  : 1940    Encounter Date: 2024    Subjective  Patient ID: Sarika Rivero is a 83 y.o. female who is long term resident being seen and evaluated for multiple medical problems.    Alert and pleasant. Denies pain and shows no respiratory distress. She ambulates short distances with walker and particpates in PT. Denies recent falls. She is compliant with medication. She continues to have a rash on shoulders and sometimes she picks at it         Review of Systems   Constitutional:  Negative for fever and unexpected weight change.   HENT:  Negative for sore throat.    Respiratory:  Negative for cough and shortness of breath.    Cardiovascular:  Negative for chest pain and palpitations.   Gastrointestinal:  Negative for abdominal pain, constipation, diarrhea, nausea and vomiting.   Genitourinary:  Negative for difficulty urinating and frequency.   Musculoskeletal:  Positive for arthralgias. Negative for back pain.   Skin:  Negative for rash.   Neurological:  Negative for dizziness, seizures and headaches.   Psychiatric/Behavioral:  Negative for agitation. The patient is not nervous/anxious.        Objective  There were no vitals taken for this visit.    Physical Exam  Vitals reviewed.   Constitutional:       General: She is not in acute distress.  HENT:      Mouth/Throat:      Mouth: Mucous membranes are moist.   Cardiovascular:      Rate and Rhythm: Regular rhythm.      Heart sounds: Normal heart sounds.   Pulmonary:      Breath sounds: Normal breath sounds. No rales.   Abdominal:      Palpations: Abdomen is soft.      Tenderness: There is no abdominal tenderness.   Musculoskeletal:         General: No tenderness.      Cervical back: Neck supple. No tenderness.   Skin:     General: Skin is warm.      Findings: Erythema (Erythema patches with excoriations on both shoulders) present.   Neurological:      Mental Status: She is alert.         Assessment/Plan  Problem List Items  Addressed This Visit       Benign essential hypertension    Alzheimer's disease with late onset (Multi)    Osteoarthritis, generalized     Other Visit Diagnoses       Intrinsic eczema    -  Primary               Goals    None     Will continue triamcinolone cream, continue supportive care, fall precautions      Electronically Signed By: Yadira Sanchez MD   7/23/24  7:53 PM

## 2024-07-11 ASSESSMENT — ENCOUNTER SYMPTOMS
CONSTIPATION: 0
FREQUENCY: 0
BACK PAIN: 0
NAUSEA: 0
SHORTNESS OF BREATH: 0
NERVOUS/ANXIOUS: 0
DIZZINESS: 0
SORE THROAT: 0
DIFFICULTY URINATING: 0
SEIZURES: 0
DIARRHEA: 0
ABDOMINAL PAIN: 0
ARTHRALGIAS: 1
VOMITING: 0
COUGH: 0
UNEXPECTED WEIGHT CHANGE: 0
FEVER: 0
AGITATION: 0
PALPITATIONS: 0
HEADACHES: 0

## 2024-07-11 NOTE — PROGRESS NOTES
Subjective   Patient ID: Sarika Rivero is a 83 y.o. female who is long term resident being seen and evaluated for multiple medical problems.    Alert and pleasant. Denies pain and shows no respiratory distress. She ambulates short distances with walker and particpates in PT. Denies recent falls. She is compliant with medication.         Review of Systems   Constitutional:  Negative for fever and unexpected weight change.   HENT:  Negative for sore throat.    Respiratory:  Negative for cough and shortness of breath.    Cardiovascular:  Negative for chest pain and palpitations.   Gastrointestinal:  Negative for abdominal pain, constipation, diarrhea, nausea and vomiting.   Genitourinary:  Negative for difficulty urinating and frequency.   Musculoskeletal:  Positive for arthralgias. Negative for back pain.   Skin:  Negative for rash.   Neurological:  Negative for dizziness, seizures and headaches.   Psychiatric/Behavioral:  Negative for agitation. The patient is not nervous/anxious.        Objective   There were no vitals taken for this visit.    Physical Exam  Vitals reviewed.   Constitutional:       General: She is not in acute distress.  HENT:      Mouth/Throat:      Mouth: Mucous membranes are moist.   Cardiovascular:      Rate and Rhythm: Regular rhythm.      Heart sounds: Normal heart sounds.   Pulmonary:      Breath sounds: Normal breath sounds. No rales.   Abdominal:      Palpations: Abdomen is soft.      Tenderness: There is no abdominal tenderness.   Musculoskeletal:         General: No tenderness.      Cervical back: Neck supple. No tenderness.   Skin:     General: Skin is warm.   Neurological:      General: No focal deficit present.      Mental Status: She is alert.         Assessment/Plan   Problem List Items Addressed This Visit       Esophageal reflux    Alzheimer's disease with late onset (Multi)    Osteoarthritis, generalized - Primary        Goals    None     Will continue fall precautions

## 2024-07-23 ASSESSMENT — ENCOUNTER SYMPTOMS
COUGH: 0
FREQUENCY: 0
ABDOMINAL PAIN: 0
UNEXPECTED WEIGHT CHANGE: 0
AGITATION: 0
SHORTNESS OF BREATH: 0
VOMITING: 0
NAUSEA: 0
HEADACHES: 0
SEIZURES: 0
BACK PAIN: 0
ARTHRALGIAS: 1
DIFFICULTY URINATING: 0
DIARRHEA: 0
NERVOUS/ANXIOUS: 0
SORE THROAT: 0
FEVER: 0
DIZZINESS: 0
CONSTIPATION: 0
PALPITATIONS: 0

## 2024-07-23 NOTE — PROGRESS NOTES
Subjective   Patient ID: Sarika Rivero is a 83 y.o. female who is long term resident being seen and evaluated for multiple medical problems.    Alert and pleasant. Denies pain and shows no respiratory distress. She ambulates short distances with walker and particpates in PT. Denies recent falls. She is compliant with medication. She continues to have a rash on shoulders and sometimes she picks at it         Review of Systems   Constitutional:  Negative for fever and unexpected weight change.   HENT:  Negative for sore throat.    Respiratory:  Negative for cough and shortness of breath.    Cardiovascular:  Negative for chest pain and palpitations.   Gastrointestinal:  Negative for abdominal pain, constipation, diarrhea, nausea and vomiting.   Genitourinary:  Negative for difficulty urinating and frequency.   Musculoskeletal:  Positive for arthralgias. Negative for back pain.   Skin:  Negative for rash.   Neurological:  Negative for dizziness, seizures and headaches.   Psychiatric/Behavioral:  Negative for agitation. The patient is not nervous/anxious.        Objective   There were no vitals taken for this visit.    Physical Exam  Vitals reviewed.   Constitutional:       General: She is not in acute distress.  HENT:      Mouth/Throat:      Mouth: Mucous membranes are moist.   Cardiovascular:      Rate and Rhythm: Regular rhythm.      Heart sounds: Normal heart sounds.   Pulmonary:      Breath sounds: Normal breath sounds. No rales.   Abdominal:      Palpations: Abdomen is soft.      Tenderness: There is no abdominal tenderness.   Musculoskeletal:         General: No tenderness.      Cervical back: Neck supple. No tenderness.   Skin:     General: Skin is warm.      Findings: Erythema (Erythema patches with excoriations on both shoulders) present.   Neurological:      Mental Status: She is alert.         Assessment/Plan   Problem List Items Addressed This Visit       Benign essential hypertension    Alzheimer's  disease with late onset (Multi)    Osteoarthritis, generalized     Other Visit Diagnoses       Intrinsic eczema    -  Primary               Goals    None     Will continue triamcinolone cream, continue supportive care, fall precautions

## 2024-08-06 ENCOUNTER — NURSING HOME VISIT (OUTPATIENT)
Dept: POST ACUTE CARE | Facility: EXTERNAL LOCATION | Age: 84
End: 2024-08-06
Payer: MEDICARE

## 2024-08-06 DIAGNOSIS — F33.2 MAJOR DEPRESSIVE DISORDER, RECURRENT SEVERE WITHOUT PSYCHOTIC FEATURES (MULTI): ICD-10-CM

## 2024-08-06 DIAGNOSIS — I10 BENIGN ESSENTIAL HYPERTENSION: Primary | ICD-10-CM

## 2024-08-06 DIAGNOSIS — L20.84 INTRINSIC ECZEMA: ICD-10-CM

## 2024-08-06 PROCEDURE — 99308 SBSQ NF CARE LOW MDM 20: CPT | Performed by: INTERNAL MEDICINE

## 2024-08-06 ASSESSMENT — ENCOUNTER SYMPTOMS
CONSTIPATION: 0
SHORTNESS OF BREATH: 0
HEADACHES: 0
PALPITATIONS: 0
FEVER: 0
ARTHRALGIAS: 1
SEIZURES: 0
NERVOUS/ANXIOUS: 0
AGITATION: 0
UNEXPECTED WEIGHT CHANGE: 0
ABDOMINAL PAIN: 0
DIARRHEA: 0
COUGH: 0
BACK PAIN: 0
DIFFICULTY URINATING: 0
NAUSEA: 0
SORE THROAT: 0
FREQUENCY: 0
VOMITING: 0
DIZZINESS: 0

## 2024-08-06 NOTE — LETTER
Patient: Sarika Rivero  : 1940    Encounter Date: 2024    Subjective  Patient ID: Sarika Rivero is a 83 y.o. female who is long term resident being seen and evaluated for multiple medical problems.    Alert and forgetful but pleasant. Denies pain. She ambulates short distances with walker and particpates in PT. Denies recent falls. She is compliant with medication.          Review of Systems   Constitutional:  Negative for fever and unexpected weight change.   HENT:  Negative for sore throat.    Respiratory:  Negative for cough and shortness of breath.    Cardiovascular:  Negative for chest pain and palpitations.   Gastrointestinal:  Negative for abdominal pain, constipation, diarrhea, nausea and vomiting.   Genitourinary:  Negative for difficulty urinating and frequency.   Musculoskeletal:  Positive for arthralgias. Negative for back pain.   Skin:  Negative for rash.   Neurological:  Negative for dizziness, seizures and headaches.   Psychiatric/Behavioral:  Negative for agitation. The patient is not nervous/anxious.        Objective  There were no vitals taken for this visit.    Physical Exam  Vitals reviewed.   Constitutional:       General: She is not in acute distress.  HENT:      Mouth/Throat:      Mouth: Mucous membranes are moist.   Cardiovascular:      Rate and Rhythm: Regular rhythm.      Heart sounds: Normal heart sounds.   Pulmonary:      Breath sounds: Normal breath sounds. No rales.   Abdominal:      Palpations: Abdomen is soft.      Tenderness: There is no abdominal tenderness.   Musculoskeletal:         General: No tenderness.      Cervical back: Neck supple. No tenderness.   Skin:     Findings: Erythema (Erythema patches with excoriations on both shoulders) present.   Neurological:      Mental Status: She is alert.         Assessment/Plan  Problem List Items Addressed This Visit       Benign essential hypertension - Primary    Major depressive disorder, recurrent severe without  psychotic features (Multi)     Other Visit Diagnoses       Intrinsic eczema                     Goals    None     Will continue supportive care, fall precautions, monitor blood pressure      Electronically Signed By: Yadira Sanchez MD   8/6/24  9:07 PM

## 2024-08-07 NOTE — PROGRESS NOTES
Subjective   Patient ID: Sarika Rivreo is a 83 y.o. female who is long term resident being seen and evaluated for multiple medical problems.    Alert and forgetful but pleasant. Denies pain. She ambulates short distances with walker and particpates in PT. Denies recent falls. She is compliant with medication.          Review of Systems   Constitutional:  Negative for fever and unexpected weight change.   HENT:  Negative for sore throat.    Respiratory:  Negative for cough and shortness of breath.    Cardiovascular:  Negative for chest pain and palpitations.   Gastrointestinal:  Negative for abdominal pain, constipation, diarrhea, nausea and vomiting.   Genitourinary:  Negative for difficulty urinating and frequency.   Musculoskeletal:  Positive for arthralgias. Negative for back pain.   Skin:  Negative for rash.   Neurological:  Negative for dizziness, seizures and headaches.   Psychiatric/Behavioral:  Negative for agitation. The patient is not nervous/anxious.        Objective   There were no vitals taken for this visit.    Physical Exam  Vitals reviewed.   Constitutional:       General: She is not in acute distress.  HENT:      Mouth/Throat:      Mouth: Mucous membranes are moist.   Cardiovascular:      Rate and Rhythm: Regular rhythm.      Heart sounds: Normal heart sounds.   Pulmonary:      Breath sounds: Normal breath sounds. No rales.   Abdominal:      Palpations: Abdomen is soft.      Tenderness: There is no abdominal tenderness.   Musculoskeletal:         General: No tenderness.      Cervical back: Neck supple. No tenderness.   Skin:     Findings: Erythema (Erythema patches with excoriations on both shoulders) present.   Neurological:      Mental Status: She is alert.         Assessment/Plan   Problem List Items Addressed This Visit       Benign essential hypertension - Primary    Major depressive disorder, recurrent severe without psychotic features (Multi)     Other Visit Diagnoses       Intrinsic  eczema                     Goals    None     Will continue supportive care, fall precautions, monitor blood pressure

## 2024-09-10 ENCOUNTER — NURSING HOME VISIT (OUTPATIENT)
Dept: POST ACUTE CARE | Facility: EXTERNAL LOCATION | Age: 84
End: 2024-09-10
Payer: MEDICARE

## 2024-09-10 DIAGNOSIS — K21.9 GASTROESOPHAGEAL REFLUX DISEASE WITHOUT ESOPHAGITIS: ICD-10-CM

## 2024-09-10 DIAGNOSIS — N18.31 STAGE 3A CHRONIC KIDNEY DISEASE (MULTI): Primary | ICD-10-CM

## 2024-09-10 DIAGNOSIS — F02.80 ALZHEIMER'S DISEASE WITH LATE ONSET (MULTI): ICD-10-CM

## 2024-09-10 DIAGNOSIS — G30.1 ALZHEIMER'S DISEASE WITH LATE ONSET (MULTI): ICD-10-CM

## 2024-09-10 DIAGNOSIS — E78.2 MIXED HYPERLIPIDEMIA: ICD-10-CM

## 2024-09-10 PROCEDURE — 99308 SBSQ NF CARE LOW MDM 20: CPT | Performed by: INTERNAL MEDICINE

## 2024-09-10 NOTE — LETTER
Patient: Sarika Rivero  : 1940    Encounter Date: 09/10/2024    Subjective  Patient ID: Sarika Rivero is a 83 y.o. female who is long term resident being seen and evaluated for multiple medical problems.    Alert, disoriented. Denies pain. She ambulates short distances with walker and had no recent falls. She is compliant with medication.          Review of Systems   Constitutional:  Negative for fever and unexpected weight change.   HENT:  Negative for sore throat.    Respiratory:  Negative for cough and shortness of breath.    Cardiovascular:  Negative for chest pain and palpitations.   Gastrointestinal:  Negative for abdominal pain, constipation, diarrhea, nausea and vomiting.   Genitourinary:  Negative for difficulty urinating and frequency.   Musculoskeletal:  Positive for arthralgias. Negative for back pain.   Skin:  Negative for rash.   Neurological:  Negative for dizziness, seizures and headaches.   Psychiatric/Behavioral:  Negative for agitation. The patient is not nervous/anxious.        Objective  There were no vitals taken for this visit.    Physical Exam  Vitals reviewed.   Constitutional:       General: She is not in acute distress.  HENT:      Mouth/Throat:      Mouth: Mucous membranes are moist.   Cardiovascular:      Rate and Rhythm: Regular rhythm.      Heart sounds: Normal heart sounds.   Pulmonary:      Breath sounds: Normal breath sounds. No rales.   Abdominal:      Palpations: Abdomen is soft.      Tenderness: There is no abdominal tenderness.   Musculoskeletal:         General: No tenderness.      Cervical back: Neck supple. No tenderness.   Neurological:      Mental Status: She is alert.         Assessment/Plan  Problem List Items Addressed This Visit       Stage 3a chronic kidney disease (Multi) - Primary    Esophageal reflux    Mixed hyperlipidemia    Alzheimer's disease with late onset (Multi)              Goals    None     Will continue supportive care, fall precautions,  monitor blood pressure, check labs      Electronically Signed By: Yadira Sanchez MD   9/16/24 10:48 PM

## 2024-09-16 ASSESSMENT — ENCOUNTER SYMPTOMS
DIFFICULTY URINATING: 0
ARTHRALGIAS: 1
VOMITING: 0
DIARRHEA: 0
NAUSEA: 0
UNEXPECTED WEIGHT CHANGE: 0
NERVOUS/ANXIOUS: 0
BACK PAIN: 0
HEADACHES: 0
PALPITATIONS: 0
CONSTIPATION: 0
COUGH: 0
SEIZURES: 0
AGITATION: 0
FREQUENCY: 0
DIZZINESS: 0
SHORTNESS OF BREATH: 0
ABDOMINAL PAIN: 0
FEVER: 0
SORE THROAT: 0

## 2024-09-17 NOTE — PROGRESS NOTES
Subjective   Patient ID: Sarika Rivero is a 83 y.o. female who is long term resident being seen and evaluated for multiple medical problems.    Alert, disoriented. Denies pain. She ambulates short distances with walker and had no recent falls. She is compliant with medication.          Review of Systems   Constitutional:  Negative for fever and unexpected weight change.   HENT:  Negative for sore throat.    Respiratory:  Negative for cough and shortness of breath.    Cardiovascular:  Negative for chest pain and palpitations.   Gastrointestinal:  Negative for abdominal pain, constipation, diarrhea, nausea and vomiting.   Genitourinary:  Negative for difficulty urinating and frequency.   Musculoskeletal:  Positive for arthralgias. Negative for back pain.   Skin:  Negative for rash.   Neurological:  Negative for dizziness, seizures and headaches.   Psychiatric/Behavioral:  Negative for agitation. The patient is not nervous/anxious.        Objective   There were no vitals taken for this visit.    Physical Exam  Vitals reviewed.   Constitutional:       General: She is not in acute distress.  HENT:      Mouth/Throat:      Mouth: Mucous membranes are moist.   Cardiovascular:      Rate and Rhythm: Regular rhythm.      Heart sounds: Normal heart sounds.   Pulmonary:      Breath sounds: Normal breath sounds. No rales.   Abdominal:      Palpations: Abdomen is soft.      Tenderness: There is no abdominal tenderness.   Musculoskeletal:         General: No tenderness.      Cervical back: Neck supple. No tenderness.   Neurological:      Mental Status: She is alert.         Assessment/Plan   Problem List Items Addressed This Visit       Stage 3a chronic kidney disease (Multi) - Primary    Esophageal reflux    Mixed hyperlipidemia    Alzheimer's disease with late onset (Multi)              Goals    None     Will continue supportive care, fall precautions, monitor blood pressure, check labs

## 2024-10-01 ENCOUNTER — NURSING HOME VISIT (OUTPATIENT)
Dept: POST ACUTE CARE | Facility: EXTERNAL LOCATION | Age: 84
End: 2024-10-01
Payer: MEDICARE

## 2024-10-01 DIAGNOSIS — G30.1 ALZHEIMER'S DISEASE WITH LATE ONSET (MULTI): ICD-10-CM

## 2024-10-01 DIAGNOSIS — F02.80 ALZHEIMER'S DISEASE WITH LATE ONSET (MULTI): ICD-10-CM

## 2024-10-01 DIAGNOSIS — M15.9 OSTEOARTHRITIS, GENERALIZED: Primary | ICD-10-CM

## 2024-10-01 DIAGNOSIS — N18.31 STAGE 3A CHRONIC KIDNEY DISEASE (MULTI): ICD-10-CM

## 2024-10-01 PROCEDURE — 99308 SBSQ NF CARE LOW MDM 20: CPT | Performed by: INTERNAL MEDICINE

## 2024-10-01 NOTE — LETTER
Patient: Sarika Rivero  : 1940    Encounter Date: 10/01/2024    Subjective  Patient ID: Sarika Rivero is a 83 y.o. female who is long term resident being seen and evaluated for multiple medical problems.    Alert, disoriented but pleasant.  Appetite okay. Denies pain. She ambulates short distances with walker and had no recent falls. She is compliant with medication.          Review of Systems   Constitutional:  Negative for fever and unexpected weight change.   HENT:  Negative for sore throat.    Respiratory:  Negative for cough and shortness of breath.    Cardiovascular:  Negative for chest pain and palpitations.   Gastrointestinal:  Negative for abdominal pain, constipation, diarrhea, nausea and vomiting.   Genitourinary:  Negative for difficulty urinating and frequency.   Musculoskeletal:  Positive for arthralgias. Negative for back pain.   Skin:  Negative for rash.   Neurological:  Negative for dizziness, seizures and headaches.   Psychiatric/Behavioral:  Negative for agitation. The patient is not nervous/anxious.        Objective  There were no vitals taken for this visit.    Physical Exam  Vitals reviewed.   Constitutional:       General: She is not in acute distress.  HENT:      Mouth/Throat:      Mouth: Mucous membranes are moist.   Cardiovascular:      Rate and Rhythm: Regular rhythm.      Heart sounds: Normal heart sounds.   Pulmonary:      Breath sounds: Normal breath sounds. No rales.   Abdominal:      Palpations: Abdomen is soft.      Tenderness: There is no abdominal tenderness.   Musculoskeletal:         General: No tenderness.      Cervical back: Neck supple. No tenderness.   Neurological:      Mental Status: She is alert.         Assessment/Plan  Problem List Items Addressed This Visit       Stage 3a chronic kidney disease (Multi)    Alzheimer's disease with late onset (Multi)    Osteoarthritis, generalized - Primary                Goals    None     Will continue supportive care,  fall precautions, monitor blood pressure,      Electronically Signed By: Yadira Sanchez MD   10/8/24 10:34 AM

## 2024-10-08 ASSESSMENT — ENCOUNTER SYMPTOMS
SEIZURES: 0
COUGH: 0
SORE THROAT: 0
FREQUENCY: 0
BACK PAIN: 0
DIZZINESS: 0
ARTHRALGIAS: 1
UNEXPECTED WEIGHT CHANGE: 0
NAUSEA: 0
SHORTNESS OF BREATH: 0
DIARRHEA: 0
NERVOUS/ANXIOUS: 0
PALPITATIONS: 0
CONSTIPATION: 0
FEVER: 0
AGITATION: 0
ABDOMINAL PAIN: 0
DIFFICULTY URINATING: 0
HEADACHES: 0
VOMITING: 0

## 2024-10-08 NOTE — PROGRESS NOTES
Subjective   Patient ID: Sarika Rivero is a 83 y.o. female who is long term resident being seen and evaluated for multiple medical problems.    Alert, disoriented but pleasant.  Appetite okay. Denies pain. She ambulates short distances with walker and had no recent falls. She is compliant with medication.          Review of Systems   Constitutional:  Negative for fever and unexpected weight change.   HENT:  Negative for sore throat.    Respiratory:  Negative for cough and shortness of breath.    Cardiovascular:  Negative for chest pain and palpitations.   Gastrointestinal:  Negative for abdominal pain, constipation, diarrhea, nausea and vomiting.   Genitourinary:  Negative for difficulty urinating and frequency.   Musculoskeletal:  Positive for arthralgias. Negative for back pain.   Skin:  Negative for rash.   Neurological:  Negative for dizziness, seizures and headaches.   Psychiatric/Behavioral:  Negative for agitation. The patient is not nervous/anxious.        Objective   There were no vitals taken for this visit.    Physical Exam  Vitals reviewed.   Constitutional:       General: She is not in acute distress.  HENT:      Mouth/Throat:      Mouth: Mucous membranes are moist.   Cardiovascular:      Rate and Rhythm: Regular rhythm.      Heart sounds: Normal heart sounds.   Pulmonary:      Breath sounds: Normal breath sounds. No rales.   Abdominal:      Palpations: Abdomen is soft.      Tenderness: There is no abdominal tenderness.   Musculoskeletal:         General: No tenderness.      Cervical back: Neck supple. No tenderness.   Neurological:      Mental Status: She is alert.         Assessment/Plan   Problem List Items Addressed This Visit       Stage 3a chronic kidney disease (Multi)    Alzheimer's disease with late onset (Multi)    Osteoarthritis, generalized - Primary                Goals    None     Will continue supportive care, fall precautions, monitor blood pressure,

## 2024-10-15 ENCOUNTER — NURSING HOME VISIT (OUTPATIENT)
Dept: POST ACUTE CARE | Facility: EXTERNAL LOCATION | Age: 84
End: 2024-10-15

## 2024-10-15 DIAGNOSIS — J34.0 CELLULITIS OF NOSE, EXTERNAL: Primary | ICD-10-CM

## 2024-10-15 DIAGNOSIS — G30.1 ALZHEIMER'S DISEASE WITH LATE ONSET (MULTI): ICD-10-CM

## 2024-10-15 DIAGNOSIS — M15.9 OSTEOARTHRITIS, GENERALIZED: ICD-10-CM

## 2024-10-15 DIAGNOSIS — F02.80 ALZHEIMER'S DISEASE WITH LATE ONSET (MULTI): ICD-10-CM

## 2024-10-15 PROCEDURE — 99309 SBSQ NF CARE MODERATE MDM 30: CPT | Performed by: INTERNAL MEDICINE

## 2024-10-15 NOTE — LETTER
Patient: Sarika Rivero  : 1940    Encounter Date: 10/15/2024    Subjective  Patient ID: Sarika Rievro is a 83 y.o. female who is long term resident being seen and evaluated for multiple medical problems.    Alert, disoriented , anxious, picking at her nose and causing excoriations.  Appetite okay. Denies pain. She ambulates short distances with walker and had no recent falls. She is compliant with medication.          Review of Systems   Constitutional:  Negative for fever and unexpected weight change.   HENT:  Negative for sore throat.    Respiratory:  Negative for cough and shortness of breath.    Cardiovascular:  Negative for chest pain and palpitations.   Gastrointestinal:  Negative for abdominal pain, constipation, diarrhea, nausea and vomiting.   Genitourinary:  Negative for difficulty urinating and frequency.   Musculoskeletal:  Positive for arthralgias. Negative for back pain.   Skin:  Negative for rash.   Neurological:  Negative for dizziness, seizures and headaches.   Psychiatric/Behavioral:  Negative for agitation. The patient is not nervous/anxious.        Objective  There were no vitals taken for this visit.    Physical Exam  Vitals reviewed.   Constitutional:       General: She is not in acute distress.  HENT:      Mouth/Throat:      Mouth: Mucous membranes are moist.   Cardiovascular:      Rate and Rhythm: Regular rhythm.      Heart sounds: Normal heart sounds.   Pulmonary:      Breath sounds: Normal breath sounds. No rales.   Abdominal:      Palpations: Abdomen is soft.      Tenderness: There is no abdominal tenderness.   Musculoskeletal:         General: No tenderness.      Cervical back: Neck supple. No tenderness.   Skin:     Findings: Erythema (And excoriations of the nostrils) present.   Neurological:      Mental Status: She is alert.         Assessment/Plan  Problem List Items Addressed This Visit       Alzheimer's disease with late onset (Multi)    Osteoarthritis, generalized      Other Visit Diagnoses       Cellulitis of nose, external    -  Primary                       Goals    None     Will apply mupirocin tocreations, continue supportive care, psychiatric follow-up      Electronically Signed By: Yadira Sanchez MD   10/17/24 11:33 AM

## 2024-10-17 ASSESSMENT — ENCOUNTER SYMPTOMS
BACK PAIN: 0
DIARRHEA: 0
NERVOUS/ANXIOUS: 0
ABDOMINAL PAIN: 0
SORE THROAT: 0
AGITATION: 0
FEVER: 0
COUGH: 0
ARTHRALGIAS: 1
PALPITATIONS: 0
FREQUENCY: 0
SEIZURES: 0
SHORTNESS OF BREATH: 0
VOMITING: 0
NAUSEA: 0
DIZZINESS: 0
UNEXPECTED WEIGHT CHANGE: 0
DIFFICULTY URINATING: 0
CONSTIPATION: 0
HEADACHES: 0

## 2024-11-01 ENCOUNTER — NURSING HOME VISIT (OUTPATIENT)
Dept: POST ACUTE CARE | Facility: EXTERNAL LOCATION | Age: 84
End: 2024-11-01
Payer: MEDICARE

## 2024-11-01 DIAGNOSIS — G30.1 ALZHEIMER'S DISEASE WITH LATE ONSET (MULTI): ICD-10-CM

## 2024-11-01 DIAGNOSIS — J34.0 CELLULITIS OF NOSE, EXTERNAL: Primary | ICD-10-CM

## 2024-11-01 DIAGNOSIS — M15.9 OSTEOARTHRITIS, GENERALIZED: ICD-10-CM

## 2024-11-01 DIAGNOSIS — F02.80 ALZHEIMER'S DISEASE WITH LATE ONSET (MULTI): ICD-10-CM

## 2024-11-01 PROCEDURE — 99309 SBSQ NF CARE MODERATE MDM 30: CPT | Performed by: INTERNAL MEDICINE

## 2024-11-01 NOTE — LETTER
Patient: Sarika Rivero  : 1940    Encounter Date: 2024    Subjective  Patient ID: Sarika Rivero is a 83 y.o. female who is long term resident being seen and evaluated for multiple medical problems.    Alert, disoriented , per staff she is more anxious, she is c/o pain tip of her nose, she is picking at her nose and causing excoriations and bleeding.  Appetite okay. Denies pain. She ambulates short distances with walker and had no recent falls. She is compliant with medication.          Review of Systems   Constitutional:  Negative for fever and unexpected weight change.   HENT:  Negative for sore throat.    Respiratory:  Negative for cough and shortness of breath.    Cardiovascular:  Negative for chest pain and palpitations.   Gastrointestinal:  Negative for abdominal pain, constipation, diarrhea, nausea and vomiting.   Genitourinary:  Negative for difficulty urinating and frequency.   Musculoskeletal:  Positive for arthralgias. Negative for back pain.   Skin:  Negative for rash.   Neurological:  Negative for dizziness, seizures and headaches.   Psychiatric/Behavioral:  Negative for agitation. The patient is not nervous/anxious.        Objective  There were no vitals taken for this visit.    Physical Exam  Vitals reviewed.   Constitutional:       General: She is not in acute distress.  HENT:      Nose:      Comments: Redness and swelling of tip of her nose, tender to touch     Mouth/Throat:      Mouth: Mucous membranes are moist.   Cardiovascular:      Rate and Rhythm: Regular rhythm.      Heart sounds: Normal heart sounds.   Pulmonary:      Breath sounds: Normal breath sounds. No rales.   Abdominal:      Palpations: Abdomen is soft.      Tenderness: There is no abdominal tenderness.   Musculoskeletal:         General: No tenderness.      Cervical back: Neck supple. No tenderness.   Neurological:      Mental Status: She is alert.         Assessment/Plan  Problem List Items Addressed This Visit        Alzheimer's disease with late onset (Multi)    Osteoarthritis, generalized     Other Visit Diagnoses       Cellulitis of nose, external    -  Primary                         Goals    None     Will start Doxycycline, apply mupirocin cream, check labs,  continue supportive care, psychiatric follow-up      Electronically Signed By: Yadira Sanchez MD   11/4/24 11:18 AM

## 2024-11-04 ASSESSMENT — ENCOUNTER SYMPTOMS
UNEXPECTED WEIGHT CHANGE: 0
ARTHRALGIAS: 1
NERVOUS/ANXIOUS: 0
BACK PAIN: 0
HEADACHES: 0
PALPITATIONS: 0
SHORTNESS OF BREATH: 0
ABDOMINAL PAIN: 0
SORE THROAT: 0
VOMITING: 0
COUGH: 0
DIARRHEA: 0
AGITATION: 0
NAUSEA: 0
DIFFICULTY URINATING: 0
CONSTIPATION: 0
SEIZURES: 0
FREQUENCY: 0
FEVER: 0
DIZZINESS: 0

## 2024-11-04 NOTE — PROGRESS NOTES
Subjective   Patient ID: Sarika Rivero is a 83 y.o. female who is long term resident being seen and evaluated for multiple medical problems.    Alert, disoriented , per staff she is more anxious, she is c/o pain tip of her nose, she is picking at her nose and causing excoriations and bleeding.  Appetite okay. Denies pain. She ambulates short distances with walker and had no recent falls. She is compliant with medication.          Review of Systems   Constitutional:  Negative for fever and unexpected weight change.   HENT:  Negative for sore throat.    Respiratory:  Negative for cough and shortness of breath.    Cardiovascular:  Negative for chest pain and palpitations.   Gastrointestinal:  Negative for abdominal pain, constipation, diarrhea, nausea and vomiting.   Genitourinary:  Negative for difficulty urinating and frequency.   Musculoskeletal:  Positive for arthralgias. Negative for back pain.   Skin:  Negative for rash.   Neurological:  Negative for dizziness, seizures and headaches.   Psychiatric/Behavioral:  Negative for agitation. The patient is not nervous/anxious.        Objective   There were no vitals taken for this visit.    Physical Exam  Vitals reviewed.   Constitutional:       General: She is not in acute distress.  HENT:      Nose:      Comments: Redness and swelling of tip of her nose, tender to touch     Mouth/Throat:      Mouth: Mucous membranes are moist.   Cardiovascular:      Rate and Rhythm: Regular rhythm.      Heart sounds: Normal heart sounds.   Pulmonary:      Breath sounds: Normal breath sounds. No rales.   Abdominal:      Palpations: Abdomen is soft.      Tenderness: There is no abdominal tenderness.   Musculoskeletal:         General: No tenderness.      Cervical back: Neck supple. No tenderness.   Neurological:      Mental Status: She is alert.         Assessment/Plan   Problem List Items Addressed This Visit       Alzheimer's disease with late onset (Multi)    Osteoarthritis,  generalized     Other Visit Diagnoses       Cellulitis of nose, external    -  Primary                         Goals    None     Will start Doxycycline, apply mupirocin cream, check labs,  continue supportive care, psychiatric follow-up

## 2024-11-05 ENCOUNTER — NURSING HOME VISIT (OUTPATIENT)
Dept: POST ACUTE CARE | Facility: EXTERNAL LOCATION | Age: 84
End: 2024-11-05
Payer: MEDICARE

## 2024-11-05 DIAGNOSIS — G30.1 ALZHEIMER'S DISEASE WITH LATE ONSET (MULTI): Primary | ICD-10-CM

## 2024-11-05 DIAGNOSIS — M15.9 OSTEOARTHRITIS, GENERALIZED: ICD-10-CM

## 2024-11-05 DIAGNOSIS — F33.2 MAJOR DEPRESSIVE DISORDER, RECURRENT SEVERE WITHOUT PSYCHOTIC FEATURES (MULTI): ICD-10-CM

## 2024-11-05 DIAGNOSIS — F02.80 ALZHEIMER'S DISEASE WITH LATE ONSET (MULTI): Primary | ICD-10-CM

## 2024-11-05 PROCEDURE — 99308 SBSQ NF CARE LOW MDM 20: CPT | Performed by: INTERNAL MEDICINE

## 2024-11-05 NOTE — LETTER
Patient: Sarika Rivero  : 1940    Encounter Date: 2024    Subjective  Patient ID: Sarika Rivero is a 83 y.o. female who is long term resident being seen and evaluated for multiple medical problems.    Alert, disoriented , per staff she is more anxious, and having poor sleep.  Appetite okay. Denies pain. She ambulates short distances with walker and had no recent falls. She is compliant with medication.          Review of Systems   Constitutional:  Negative for fever and unexpected weight change.   HENT:  Negative for sore throat.    Respiratory:  Negative for cough and shortness of breath.    Cardiovascular:  Negative for chest pain and palpitations.   Gastrointestinal:  Negative for abdominal pain, constipation, diarrhea, nausea and vomiting.   Genitourinary:  Negative for difficulty urinating and frequency.   Musculoskeletal:  Positive for arthralgias. Negative for back pain.   Skin:  Negative for rash.   Neurological:  Negative for dizziness, seizures and headaches.   Psychiatric/Behavioral:  Negative for agitation. The patient is not nervous/anxious.        Objective  There were no vitals taken for this visit.    Physical Exam  Vitals reviewed.   Constitutional:       General: She is not in acute distress.  HENT:      Nose:      Comments: Redness and excoriations of tip of her nose     Mouth/Throat:      Mouth: Mucous membranes are moist.   Cardiovascular:      Rate and Rhythm: Regular rhythm.      Heart sounds: Normal heart sounds.   Pulmonary:      Breath sounds: Normal breath sounds. No rales.   Abdominal:      Palpations: Abdomen is soft.      Tenderness: There is no abdominal tenderness.   Musculoskeletal:         General: No tenderness.      Cervical back: Neck supple. No tenderness.   Neurological:      Mental Status: She is alert.         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

## 2024-11-15 ENCOUNTER — NURSING HOME VISIT (OUTPATIENT)
Dept: PRIMARY CARE | Facility: CLINIC | Age: 84
End: 2024-11-15
Payer: MEDICARE

## 2024-11-15 DIAGNOSIS — N18.31 STAGE 3A CHRONIC KIDNEY DISEASE (MULTI): ICD-10-CM

## 2024-11-15 DIAGNOSIS — I10 BENIGN ESSENTIAL HYPERTENSION: ICD-10-CM

## 2024-11-15 DIAGNOSIS — F03.918 DEMENTIA WITH BEHAVIORAL DISTURBANCE (MULTI): Primary | ICD-10-CM

## 2024-11-18 ASSESSMENT — ENCOUNTER SYMPTOMS
FREQUENCY: 0
NAUSEA: 0
DIZZINESS: 0
ABDOMINAL PAIN: 0
SHORTNESS OF BREATH: 0
BACK PAIN: 0
CONSTIPATION: 0
SORE THROAT: 0
DIARRHEA: 0
DIFFICULTY URINATING: 0
SEIZURES: 0
UNEXPECTED WEIGHT CHANGE: 0
HEADACHES: 0
PALPITATIONS: 0
AGITATION: 0
FEVER: 0
ARTHRALGIAS: 1
VOMITING: 0
NERVOUS/ANXIOUS: 0
COUGH: 0

## 2024-11-18 NOTE — PROGRESS NOTES
Subjective   Patient ID: Sarika Rivero is a 83 y.o. female who is long term resident being seen and evaluated for multiple medical problems.    Alert, disoriented , per staff she is more anxious, and having poor sleep.  Appetite okay. Denies pain. She ambulates short distances with walker and had no recent falls. She is compliant with medication.          Review of Systems   Constitutional:  Negative for fever and unexpected weight change.   HENT:  Negative for sore throat.    Respiratory:  Negative for cough and shortness of breath.    Cardiovascular:  Negative for chest pain and palpitations.   Gastrointestinal:  Negative for abdominal pain, constipation, diarrhea, nausea and vomiting.   Genitourinary:  Negative for difficulty urinating and frequency.   Musculoskeletal:  Positive for arthralgias. Negative for back pain.   Skin:  Negative for rash.   Neurological:  Negative for dizziness, seizures and headaches.   Psychiatric/Behavioral:  Negative for agitation. The patient is not nervous/anxious.        Objective   There were no vitals taken for this visit.    Physical Exam  Vitals reviewed.   Constitutional:       General: She is not in acute distress.  HENT:      Nose:      Comments: Redness and excoriations of tip of her nose     Mouth/Throat:      Mouth: Mucous membranes are moist.   Cardiovascular:      Rate and Rhythm: Regular rhythm.      Heart sounds: Normal heart sounds.   Pulmonary:      Breath sounds: Normal breath sounds. No rales.   Abdominal:      Palpations: Abdomen is soft.      Tenderness: There is no abdominal tenderness.   Musculoskeletal:         General: No tenderness.      Cervical back: Neck supple. No tenderness.   Neurological:      Mental Status: She is alert.         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

## 2024-11-25 ASSESSMENT — ENCOUNTER SYMPTOMS
ARTHRALGIAS: 1
DIFFICULTY URINATING: 0
HEADACHES: 0
AGITATION: 0
CONSTIPATION: 0
VOMITING: 0
ABDOMINAL PAIN: 0
DIARRHEA: 0
PALPITATIONS: 0
FREQUENCY: 0
NERVOUS/ANXIOUS: 0
COUGH: 0
BACK PAIN: 0
SHORTNESS OF BREATH: 0
SEIZURES: 0
NAUSEA: 0
UNEXPECTED WEIGHT CHANGE: 0
SORE THROAT: 0
FEVER: 0
DIZZINESS: 0

## 2024-11-25 NOTE — PROGRESS NOTES
Subjective   Patient ID: Sarika Rivero is a 83 y.o. female who is long term resident being seen and evaluated for multiple medical problems.    Alert, disoriented , per staff she is more anxious, and having poor sleep.  Appetite okay. Denies pain. She ambulates short distances with walker and had no recent falls. She is compliant with medication.          Review of Systems   Constitutional:  Negative for fever and unexpected weight change.   HENT:  Negative for sore throat.    Respiratory:  Negative for cough and shortness of breath.    Cardiovascular:  Negative for chest pain and palpitations.   Gastrointestinal:  Negative for abdominal pain, constipation, diarrhea, nausea and vomiting.   Genitourinary:  Negative for difficulty urinating and frequency.   Musculoskeletal:  Positive for arthralgias. Negative for back pain.   Skin:  Negative for rash.   Neurological:  Negative for dizziness, seizures and headaches.   Psychiatric/Behavioral:  Negative for agitation. The patient is not nervous/anxious.        Objective   There were no vitals taken for this visit.    Physical Exam  Vitals reviewed.   Constitutional:       General: She is not in acute distress.  HENT:      Nose:      Comments: Redness and excoriations of tip of her nose     Mouth/Throat:      Mouth: Mucous membranes are moist.   Cardiovascular:      Rate and Rhythm: Regular rhythm.      Heart sounds: Normal heart sounds.   Pulmonary:      Breath sounds: Normal breath sounds. No rales.   Abdominal:      Palpations: Abdomen is soft.      Tenderness: There is no abdominal tenderness.   Musculoskeletal:         General: No tenderness.      Cervical back: Neck supple. No tenderness.   Neurological:      Mental Status: She is alert.         Assessment/Plan   Problem List Items Addressed This Visit       Stage 3a chronic kidney disease (Multi)    Benign essential hypertension     Other Visit Diagnoses       Dementia with behavioral disturbance (Multi)    -   Primary                             Goals    None     Will start gabapentin at bedtime to help with sleep, continue supportive care, psychiatric follow-up

## 2024-12-03 ENCOUNTER — NURSING HOME VISIT (OUTPATIENT)
Dept: POST ACUTE CARE | Facility: EXTERNAL LOCATION | Age: 84
End: 2024-12-03
Payer: MEDICARE

## 2024-12-03 DIAGNOSIS — I10 BENIGN ESSENTIAL HYPERTENSION: Primary | ICD-10-CM

## 2024-12-03 DIAGNOSIS — F33.2 MAJOR DEPRESSIVE DISORDER, RECURRENT SEVERE WITHOUT PSYCHOTIC FEATURES (MULTI): ICD-10-CM

## 2024-12-03 DIAGNOSIS — F02.80 ALZHEIMER'S DISEASE WITH LATE ONSET (MULTI): ICD-10-CM

## 2024-12-03 DIAGNOSIS — G30.1 ALZHEIMER'S DISEASE WITH LATE ONSET (MULTI): ICD-10-CM

## 2024-12-03 PROCEDURE — 99308 SBSQ NF CARE LOW MDM 20: CPT | Performed by: INTERNAL MEDICINE

## 2024-12-03 NOTE — LETTER
Patient: Sarika Rivero  : 1940    Encounter Date: 2024    Subjective  Patient ID: Sarika Rivero is a 84 y.o. female who is long term resident being seen and evaluated for multiple medical problems.    Alert, disoriented , frequently anxious, and having poor sleep.  She was noted to have productive cough.  Appetite okay. Denies pain. She ambulates short distances with walker and had no recent falls. She is compliant with medication.          Review of Systems   Constitutional:  Negative for fever and unexpected weight change.   HENT:  Negative for sore throat.    Respiratory:  Positive for cough. Negative for shortness of breath.    Cardiovascular:  Negative for chest pain and palpitations.   Gastrointestinal:  Negative for abdominal pain, constipation, diarrhea, nausea and vomiting.   Genitourinary:  Negative for difficulty urinating and frequency.   Musculoskeletal:  Positive for arthralgias. Negative for back pain.   Skin:  Negative for rash.   Neurological:  Negative for dizziness, seizures and headaches.   Psychiatric/Behavioral:  Negative for agitation. The patient is not nervous/anxious.        Objective  There were no vitals taken for this visit.    Physical Exam  Vitals reviewed.   Constitutional:       General: She is not in acute distress.  HENT:      Nose:      Comments: Redness and excoriations of tip of her nose     Mouth/Throat:      Mouth: Mucous membranes are moist.   Cardiovascular:      Rate and Rhythm: Regular rhythm.      Heart sounds: Normal heart sounds.   Pulmonary:      Breath sounds: Normal breath sounds. No rales.   Abdominal:      Palpations: Abdomen is soft.      Tenderness: There is no abdominal tenderness.   Musculoskeletal:         General: No tenderness.      Cervical back: Neck supple. No tenderness.   Neurological:      Mental Status: She is alert.         Assessment/Plan  Problem List Items Addressed This Visit       Benign essential hypertension - Primary     Major depressive disorder, recurrent severe without psychotic features (Multi)    Alzheimer's disease with late onset (Multi)                          Goals    None     Will continue supportive care, monitor temperature, psychiatric follow-up      Electronically Signed By: Yadira Sanchez MD   3/3/25 11:28 AM

## 2025-01-07 ENCOUNTER — NURSING HOME VISIT (OUTPATIENT)
Dept: POST ACUTE CARE | Facility: EXTERNAL LOCATION | Age: 85
End: 2025-01-07
Payer: MEDICARE

## 2025-01-07 DIAGNOSIS — J34.0 CELLULITIS OF NOSE, EXTERNAL: ICD-10-CM

## 2025-01-07 DIAGNOSIS — I10 BENIGN ESSENTIAL HYPERTENSION: ICD-10-CM

## 2025-01-07 DIAGNOSIS — G30.1 ALZHEIMER'S DISEASE WITH LATE ONSET (MULTI): Primary | ICD-10-CM

## 2025-01-07 DIAGNOSIS — F02.80 ALZHEIMER'S DISEASE WITH LATE ONSET (MULTI): Primary | ICD-10-CM

## 2025-01-07 PROCEDURE — 99308 SBSQ NF CARE LOW MDM 20: CPT | Performed by: INTERNAL MEDICINE

## 2025-01-07 NOTE — LETTER
Patient: Sarika Rivero  : 1940    Encounter Date: 2025    Subjective  Patient ID: Sarika Rivero is a 84 y.o. female who is long term resident being seen and evaluated for multiple medical problems.    Alert, pleasant, frequently anxious, picking at her nose and causing excoriations.  Appetite okay. Denies pain. She ambulates short distances with walker and had no recent falls. She is compliant with medication.          Review of Systems   Constitutional:  Negative for fever and unexpected weight change.   HENT:  Negative for sore throat.    Respiratory:  Negative for shortness of breath.    Cardiovascular:  Negative for chest pain and palpitations.   Gastrointestinal:  Negative for abdominal pain, constipation, diarrhea, nausea and vomiting.   Genitourinary:  Negative for difficulty urinating and frequency.   Musculoskeletal:  Positive for arthralgias. Negative for back pain.   Skin:  Positive for wound (On the nose). Negative for rash.   Neurological:  Negative for dizziness, seizures and headaches.   Psychiatric/Behavioral:  Negative for agitation. The patient is not nervous/anxious.        Objective  There were no vitals taken for this visit.    Physical Exam  Vitals reviewed.   Constitutional:       General: She is not in acute distress.  HENT:      Nose:      Comments: Redness and excoriations of tip of her nose     Mouth/Throat:      Mouth: Mucous membranes are moist.   Cardiovascular:      Rate and Rhythm: Regular rhythm.      Heart sounds: Normal heart sounds.   Pulmonary:      Breath sounds: Normal breath sounds. No rales.   Abdominal:      Palpations: Abdomen is soft.      Tenderness: There is no abdominal tenderness.   Musculoskeletal:         General: No tenderness.      Cervical back: Neck supple. No tenderness.   Neurological:      Mental Status: She is alert.         Assessment/Plan  Problem List Items Addressed This Visit       Benign essential hypertension    Alzheimer's  disease with late onset (Multi) - Primary     Other Visit Diagnoses       Cellulitis of nose, external                                     Goals    None     Will continue mupirocin ointment, supportive care, psychiatric follow-up      Electronically Signed By: Yadira Sanchez MD   3/18/25  9:57 PM

## 2025-01-31 ENCOUNTER — NURSING HOME VISIT (OUTPATIENT)
Dept: POST ACUTE CARE | Facility: EXTERNAL LOCATION | Age: 85
End: 2025-01-31
Payer: MEDICARE

## 2025-01-31 DIAGNOSIS — G30.1 ALZHEIMER'S DISEASE WITH LATE ONSET (MULTI): ICD-10-CM

## 2025-01-31 DIAGNOSIS — F02.80 ALZHEIMER'S DISEASE WITH LATE ONSET (MULTI): ICD-10-CM

## 2025-01-31 DIAGNOSIS — M15.9 OSTEOARTHRITIS, GENERALIZED: ICD-10-CM

## 2025-01-31 DIAGNOSIS — S86.912A KNEE STRAIN, LEFT, INITIAL ENCOUNTER: Primary | ICD-10-CM

## 2025-01-31 PROCEDURE — 99309 SBSQ NF CARE MODERATE MDM 30: CPT | Performed by: INTERNAL MEDICINE

## 2025-01-31 NOTE — LETTER
Patient: Sarika Rivero  : 1940    Encounter Date: 2025    Subjective  Patient ID: Sarika Rivero is a 84 y.o. female who is long term resident being seen and evaluated for multiple medical problems.    Alert,  anxious, she had a recent fall and is complaining of left knee pain.  X-ray of left knee reviewed, no fracture.  she usually ambulates with walker or independently short distance.  Appetite okay.  She is compliant with medication.          Review of Systems   Constitutional:  Negative for fever and unexpected weight change.   HENT:  Negative for sore throat.    Respiratory:  Negative for shortness of breath.    Cardiovascular:  Negative for chest pain and palpitations.   Gastrointestinal:  Negative for abdominal pain, constipation, diarrhea, nausea and vomiting.   Genitourinary:  Negative for difficulty urinating and frequency.   Musculoskeletal:  Positive for arthralgias. Negative for back pain.   Skin:  Positive for wound (On the nose). Negative for rash.   Neurological:  Negative for dizziness, seizures and headaches.   Psychiatric/Behavioral:  Negative for agitation. The patient is not nervous/anxious.        Objective  There were no vitals taken for this visit.    Physical Exam  Vitals reviewed.   Constitutional:       General: She is not in acute distress.  HENT:      Nose:      Comments: Redness and excoriations of tip of her nose     Mouth/Throat:      Mouth: Mucous membranes are moist.   Cardiovascular:      Rate and Rhythm: Regular rhythm.      Heart sounds: Normal heart sounds.   Pulmonary:      Breath sounds: Normal breath sounds. No rales.   Abdominal:      Palpations: Abdomen is soft.      Tenderness: There is no abdominal tenderness.   Musculoskeletal:         General: No tenderness (With range of motion of left knee).      Cervical back: Neck supple. No tenderness.   Skin:     Comments: Ecchymosis of right lateral thigh   Neurological:      Mental Status: She is alert.          Assessment/Plan  Problem List Items Addressed This Visit       Alzheimer's disease with late onset (Multi)    Osteoarthritis, generalized     Other Visit Diagnoses       Knee strain, left, initial encounter    -  Primary                                   Goals    None     Will continue fall precautions, supportive care, analgesics, psychiatric follow-up      Electronically Signed By: Yadira Sanchez MD   3/24/25  8:33 AM

## 2025-02-04 ENCOUNTER — NURSING HOME VISIT (OUTPATIENT)
Dept: POST ACUTE CARE | Facility: EXTERNAL LOCATION | Age: 85
End: 2025-02-04
Payer: MEDICARE

## 2025-02-04 DIAGNOSIS — M15.9 OSTEOARTHRITIS, GENERALIZED: ICD-10-CM

## 2025-02-04 DIAGNOSIS — G30.1 ALZHEIMER'S DISEASE WITH LATE ONSET (MULTI): ICD-10-CM

## 2025-02-04 DIAGNOSIS — J34.0 CELLULITIS OF NOSE, EXTERNAL: Primary | ICD-10-CM

## 2025-02-04 DIAGNOSIS — F02.80 ALZHEIMER'S DISEASE WITH LATE ONSET (MULTI): ICD-10-CM

## 2025-02-04 PROCEDURE — 99309 SBSQ NF CARE MODERATE MDM 30: CPT | Performed by: INTERNAL MEDICINE

## 2025-02-04 NOTE — LETTER
Patient: Sarika Rivero  : 1940    Encounter Date: 2025    Subjective  Patient ID: Sarika Rivero is a 84 y.o. female who is long term resident being seen and evaluated for multiple medical problems.    Alert,  anxious, she is noted to have increased redness and excoriations of the tip of her nose, she picks at the skin frequently.  She usually ambulates with walker or independently short distance.  Appetite okay.  She is compliant with medication.          Review of Systems   Constitutional:  Negative for fever and unexpected weight change.   HENT:  Negative for sore throat.    Respiratory:  Negative for shortness of breath.    Cardiovascular:  Negative for chest pain and palpitations.   Gastrointestinal:  Negative for abdominal pain, constipation, diarrhea, nausea and vomiting.   Genitourinary:  Negative for difficulty urinating and frequency.   Musculoskeletal:  Positive for arthralgias. Negative for back pain.   Skin:  Positive for rash and wound (On the nose).   Neurological:  Negative for dizziness, seizures and headaches.   Psychiatric/Behavioral:  Negative for agitation. The patient is not nervous/anxious.        Objective  There were no vitals taken for this visit.    Physical Exam  Vitals reviewed.   Constitutional:       General: She is not in acute distress.  HENT:      Nose:      Comments: Redness and excoriations of tip of her nose     Mouth/Throat:      Mouth: Mucous membranes are moist.   Cardiovascular:      Rate and Rhythm: Regular rhythm.      Heart sounds: Normal heart sounds.   Pulmonary:      Breath sounds: Normal breath sounds. No rales.   Abdominal:      Palpations: Abdomen is soft.      Tenderness: There is no abdominal tenderness.   Musculoskeletal:         General: No tenderness (With range of motion of left knee).      Cervical back: Neck supple. No tenderness.   Skin:     Findings: Erythema (Excoriations of the tip of the nose, with erythema slight swelling) present.       Comments: Ecchymosis of right lateral thigh   Neurological:      Mental Status: She is alert.         Assessment/Plan  Problem List Items Addressed This Visit       Alzheimer's disease with late onset (Multi)    Osteoarthritis, generalized     Other Visit Diagnoses       Cellulitis of nose, external    -  Primary                                     Goals    None     Will start doxycycline and mupirocin topical, psychiatric follow-up      Electronically Signed By: Yadira Sanchez MD   3/29/25  6:43 PM

## 2025-03-03 ASSESSMENT — ENCOUNTER SYMPTOMS
PALPITATIONS: 0
ABDOMINAL PAIN: 0
AGITATION: 0
SEIZURES: 0
HEADACHES: 0
DIFFICULTY URINATING: 0
CONSTIPATION: 0
FREQUENCY: 0
ARTHRALGIAS: 1
NAUSEA: 0
VOMITING: 0
DIARRHEA: 0
SHORTNESS OF BREATH: 0
DIZZINESS: 0
COUGH: 1
UNEXPECTED WEIGHT CHANGE: 0
SORE THROAT: 0
FEVER: 0
NERVOUS/ANXIOUS: 0
BACK PAIN: 0

## 2025-03-03 NOTE — PROGRESS NOTES
Subjective   Patient ID: Sarika Rivero is a 84 y.o. female who is long term resident being seen and evaluated for multiple medical problems.    Alert, disoriented , frequently anxious, and having poor sleep.  She was noted to have productive cough.  Appetite okay. Denies pain. She ambulates short distances with walker and had no recent falls. She is compliant with medication.          Review of Systems   Constitutional:  Negative for fever and unexpected weight change.   HENT:  Negative for sore throat.    Respiratory:  Positive for cough. Negative for shortness of breath.    Cardiovascular:  Negative for chest pain and palpitations.   Gastrointestinal:  Negative for abdominal pain, constipation, diarrhea, nausea and vomiting.   Genitourinary:  Negative for difficulty urinating and frequency.   Musculoskeletal:  Positive for arthralgias. Negative for back pain.   Skin:  Negative for rash.   Neurological:  Negative for dizziness, seizures and headaches.   Psychiatric/Behavioral:  Negative for agitation. The patient is not nervous/anxious.        Objective   There were no vitals taken for this visit.    Physical Exam  Vitals reviewed.   Constitutional:       General: She is not in acute distress.  HENT:      Nose:      Comments: Redness and excoriations of tip of her nose     Mouth/Throat:      Mouth: Mucous membranes are moist.   Cardiovascular:      Rate and Rhythm: Regular rhythm.      Heart sounds: Normal heart sounds.   Pulmonary:      Breath sounds: Normal breath sounds. No rales.   Abdominal:      Palpations: Abdomen is soft.      Tenderness: There is no abdominal tenderness.   Musculoskeletal:         General: No tenderness.      Cervical back: Neck supple. No tenderness.   Neurological:      Mental Status: She is alert.         Assessment/Plan   Problem List Items Addressed This Visit       Benign essential hypertension - Primary    Major depressive disorder, recurrent severe without psychotic features  (Multi)    Alzheimer's disease with late onset (Multi)                          Goals    None     Will continue supportive care, monitor temperature, psychiatric follow-up

## 2025-03-18 ASSESSMENT — ENCOUNTER SYMPTOMS
FREQUENCY: 0
SEIZURES: 0
AGITATION: 0
SORE THROAT: 0
NERVOUS/ANXIOUS: 0
DIFFICULTY URINATING: 0
ABDOMINAL PAIN: 0
BACK PAIN: 0
WOUND: 1
UNEXPECTED WEIGHT CHANGE: 0
DIZZINESS: 0
DIARRHEA: 0
SHORTNESS OF BREATH: 0
FEVER: 0
NAUSEA: 0
HEADACHES: 0
ARTHRALGIAS: 1
PALPITATIONS: 0
CONSTIPATION: 0
VOMITING: 0

## 2025-03-19 NOTE — PROGRESS NOTES
Subjective   Patient ID: Sarika Rivero is a 84 y.o. female who is long term resident being seen and evaluated for multiple medical problems.    Alert, pleasant, frequently anxious, picking at her nose and causing excoriations.  Appetite okay. Denies pain. She ambulates short distances with walker and had no recent falls. She is compliant with medication.          Review of Systems   Constitutional:  Negative for fever and unexpected weight change.   HENT:  Negative for sore throat.    Respiratory:  Negative for shortness of breath.    Cardiovascular:  Negative for chest pain and palpitations.   Gastrointestinal:  Negative for abdominal pain, constipation, diarrhea, nausea and vomiting.   Genitourinary:  Negative for difficulty urinating and frequency.   Musculoskeletal:  Positive for arthralgias. Negative for back pain.   Skin:  Positive for wound (On the nose). Negative for rash.   Neurological:  Negative for dizziness, seizures and headaches.   Psychiatric/Behavioral:  Negative for agitation. The patient is not nervous/anxious.        Objective   There were no vitals taken for this visit.    Physical Exam  Vitals reviewed.   Constitutional:       General: She is not in acute distress.  HENT:      Nose:      Comments: Redness and excoriations of tip of her nose     Mouth/Throat:      Mouth: Mucous membranes are moist.   Cardiovascular:      Rate and Rhythm: Regular rhythm.      Heart sounds: Normal heart sounds.   Pulmonary:      Breath sounds: Normal breath sounds. No rales.   Abdominal:      Palpations: Abdomen is soft.      Tenderness: There is no abdominal tenderness.   Musculoskeletal:         General: No tenderness.      Cervical back: Neck supple. No tenderness.   Neurological:      Mental Status: She is alert.         Assessment/Plan   Problem List Items Addressed This Visit       Benign essential hypertension    Alzheimer's disease with late onset (Multi) - Primary     Other Visit Diagnoses        Cellulitis of nose, external                                     Goals    None     Will continue mupirocin ointment, supportive care, psychiatric follow-up

## 2025-03-24 ASSESSMENT — ENCOUNTER SYMPTOMS
BACK PAIN: 0
NERVOUS/ANXIOUS: 0
PALPITATIONS: 0
ARTHRALGIAS: 1
SORE THROAT: 0
DIZZINESS: 0
SEIZURES: 0
UNEXPECTED WEIGHT CHANGE: 0
HEADACHES: 0
CONSTIPATION: 0
NAUSEA: 0
DIARRHEA: 0
VOMITING: 0
DIFFICULTY URINATING: 0
AGITATION: 0
WOUND: 1
FEVER: 0
FREQUENCY: 0
ABDOMINAL PAIN: 0
SHORTNESS OF BREATH: 0

## 2025-03-24 NOTE — PROGRESS NOTES
Subjective   Patient ID: Sarika Rivero is a 84 y.o. female who is long term resident being seen and evaluated for multiple medical problems.    Alert,  anxious, she had a recent fall and is complaining of left knee pain.  X-ray of left knee reviewed, no fracture.  she usually ambulates with walker or independently short distance.  Appetite okay.  She is compliant with medication.          Review of Systems   Constitutional:  Negative for fever and unexpected weight change.   HENT:  Negative for sore throat.    Respiratory:  Negative for shortness of breath.    Cardiovascular:  Negative for chest pain and palpitations.   Gastrointestinal:  Negative for abdominal pain, constipation, diarrhea, nausea and vomiting.   Genitourinary:  Negative for difficulty urinating and frequency.   Musculoskeletal:  Positive for arthralgias. Negative for back pain.   Skin:  Positive for wound (On the nose). Negative for rash.   Neurological:  Negative for dizziness, seizures and headaches.   Psychiatric/Behavioral:  Negative for agitation. The patient is not nervous/anxious.        Objective   There were no vitals taken for this visit.    Physical Exam  Vitals reviewed.   Constitutional:       General: She is not in acute distress.  HENT:      Nose:      Comments: Redness and excoriations of tip of her nose     Mouth/Throat:      Mouth: Mucous membranes are moist.   Cardiovascular:      Rate and Rhythm: Regular rhythm.      Heart sounds: Normal heart sounds.   Pulmonary:      Breath sounds: Normal breath sounds. No rales.   Abdominal:      Palpations: Abdomen is soft.      Tenderness: There is no abdominal tenderness.   Musculoskeletal:         General: No tenderness (With range of motion of left knee).      Cervical back: Neck supple. No tenderness.   Skin:     Comments: Ecchymosis of right lateral thigh   Neurological:      Mental Status: She is alert.         Assessment/Plan   Problem List Items Addressed This Visit        Alzheimer's disease with late onset (Multi)    Osteoarthritis, generalized     Other Visit Diagnoses       Knee strain, left, initial encounter    -  Primary                                   Goals    None     Will continue fall precautions, supportive care, analgesics, psychiatric follow-up

## 2025-03-25 ENCOUNTER — HOSPITAL ENCOUNTER (EMERGENCY)
Facility: HOSPITAL | Age: 85
Discharge: HOME | End: 2025-03-26
Payer: MEDICARE

## 2025-03-25 DIAGNOSIS — Z71.1 FEARED CONDITION NOT DEMONSTRATED: Primary | ICD-10-CM

## 2025-03-25 DIAGNOSIS — Z60.5: ICD-10-CM

## 2025-03-25 PROCEDURE — 99283 EMERGENCY DEPT VISIT LOW MDM: CPT

## 2025-03-25 SDOH — SOCIAL STABILITY - SOCIAL INSECURITY: TARGET OF PERCEIVED OR REAL ADVERSE DISCRIMINATION AND PERSECUTION: Z60.5

## 2025-03-25 ASSESSMENT — PAIN - FUNCTIONAL ASSESSMENT: PAIN_FUNCTIONAL_ASSESSMENT: 0-10

## 2025-03-25 ASSESSMENT — COLUMBIA-SUICIDE SEVERITY RATING SCALE - C-SSRS
6. HAVE YOU EVER DONE ANYTHING, STARTED TO DO ANYTHING, OR PREPARED TO DO ANYTHING TO END YOUR LIFE?: NO
1. IN THE PAST MONTH, HAVE YOU WISHED YOU WERE DEAD OR WISHED YOU COULD GO TO SLEEP AND NOT WAKE UP?: NO
2. HAVE YOU ACTUALLY HAD ANY THOUGHTS OF KILLING YOURSELF?: NO

## 2025-03-25 ASSESSMENT — LIFESTYLE VARIABLES
HAVE YOU EVER FELT YOU SHOULD CUT DOWN ON YOUR DRINKING: NO
EVER HAD A DRINK FIRST THING IN THE MORNING TO STEADY YOUR NERVES TO GET RID OF A HANGOVER: NO
TOTAL SCORE: 0
HAVE PEOPLE ANNOYED YOU BY CRITICIZING YOUR DRINKING: NO
EVER FELT BAD OR GUILTY ABOUT YOUR DRINKING: NO

## 2025-03-25 ASSESSMENT — PAIN SCALES - GENERAL: PAINLEVEL_OUTOF10: 0 - NO PAIN

## 2025-03-26 VITALS
RESPIRATION RATE: 16 BRPM | TEMPERATURE: 98.8 F | WEIGHT: 124 LBS | SYSTOLIC BLOOD PRESSURE: 112 MMHG | HEIGHT: 63 IN | DIASTOLIC BLOOD PRESSURE: 71 MMHG | HEART RATE: 78 BPM | OXYGEN SATURATION: 98 % | BODY MASS INDEX: 21.97 KG/M2

## 2025-03-26 ASSESSMENT — PAIN - FUNCTIONAL ASSESSMENT: PAIN_FUNCTIONAL_ASSESSMENT: 0-10

## 2025-03-26 ASSESSMENT — PAIN SCALES - GENERAL: PAINLEVEL_OUTOF10: 0 - NO PAIN

## 2025-03-26 NOTE — ED PROVIDER NOTES
HPI   Chief Complaint   Patient presents with    [facility] Visit     Presents to ED via EMS from [facility].  Per EMS, pt was agitated at facility and did not want to be at the facility anymore.  States pt was requesting to go to the hospital.  Pt known history of dementia.  EMS states nurse at the facility was unfamiliar with pt and pt's history and advised pt to come to the hospital.  Pt has no complaints upon arrival.  A/O x2.       Patient is an 84-year-old female presents emergency department for psychiatric evaluation.  Patient is coming from [facility] where she is a long-term care facility.  She is a history of dementia with behavioral disturbances.  Reportedly throughout the day today she was trying to get out of the facility.  She reports that she was very upset as there are multiple individuals of [place] nationality and she reports that she does not see eye to eye with them.  She states because of this she reports they sent her here for evaluation.  EMS reports that she was getting more highly agitated and has a history of this.  EMS reports that she made a comment that she knew she could get out of the facility if she has to come to the hospital and that is why she had them call the squad to come to the emergency department.  Patient has no complaints at this time and feels well.  She is no thoughts of hurting herself or others.  She denies any medical complaints at today's visit.      History provided by:  Patient   used: No            Patient History   Past Medical History:   Diagnosis Date    Abnormal weight loss 04/12/2013    Weight loss    Encounter for general adult medical examination without abnormal findings 09/02/2013    Physical exam, routine    Encounter for screening mammogram for malignant neoplasm of breast     Visit for screening mammogram    Other conditions influencing health status     Breast Cancer    Other conditions influencing health status      Osteoarthritis    Other conditions influencing health status     Visit For: Routine Eye Exam    Other conditions influencing health status     X-Ray    Other conditions influencing health status 12/28/2012    Coronary Artery Disease    Other conditions influencing health status 08/19/2013    Breast Cancer    Personal history of other diseases of the circulatory system     History of hypertension    Personal history of other diseases of the circulatory system 12/02/2014    History of coronary atherosclerosis    Personal history of other diseases of the musculoskeletal system and connective tissue     History of osteopenia    Personal history of other endocrine, nutritional and metabolic disease     History of hyperlipidemia    Personal history of other mental and behavioral disorders 12/02/2014    History of depression    Personal history of other specified conditions 05/06/2013    History of nausea    Personal history of other specified conditions 04/12/2013    History of insomnia    Personal history of other specified conditions     History of insomnia     Past Surgical History:   Procedure Laterality Date    COLONOSCOPY  12/28/2012    Complete Colonoscopy    CORONARY ARTERY BYPASS GRAFT  12/28/2012    CABG    HYSTERECTOMY  12/28/2012    Hysterectomy    MASTECTOMY  12/28/2012    Breast Surgery Mastectomy    MR HEAD ANGIO WO IV CONTRAST  2/9/2022    MR HEAD ANGIO WO IV CONTRAST [facility]    OTHER SURGICAL HISTORY  12/28/2012    Laminoplasty Cervical    OTHER SURGICAL HISTORY  12/28/2012    Post Spinal Diskect Osteophytect Lumb Interspace Microdiscec    OTHER SURGICAL HISTORY  12/28/2012    Hip Arthroscopy    OTHER SURGICAL HISTORY  09/14/2014    Breast Surgery Reduction Procedure Left Breast     No family history on file.  Social History     Tobacco Use    Smoking status: Not on file    Smokeless tobacco: Not on file   Substance Use Topics    Alcohol use: Not on file    Drug use: Not on file        Physical Exam   ED Triage Vitals [03/25/25 2159]   Temperature Heart Rate Respirations BP   37.1 °C (98.8 °F) 80 16 115/68      Pulse Ox Temp Source Heart Rate Source Patient Position   95 % Oral Monitor --      BP Location FiO2 (%)     -- --       Physical Exam  Constitutional:       Appearance: Normal appearance.   Cardiovascular:      Rate and Rhythm: Normal rate and regular rhythm.   Pulmonary:      Effort: Pulmonary effort is normal.      Breath sounds: Normal breath sounds.   Musculoskeletal:         General: Normal range of motion.   Skin:     General: Skin is warm and dry.   Neurological:      General: No focal deficit present.      Mental Status: She is alert. Mental status is at baseline.   Psychiatric:         Mood and Affect: Mood is not anxious or depressed.         Behavior: Behavior is not agitated, aggressive, withdrawn, hyperactive or combative.         Thought Content: Thought content is not paranoid. Thought content does not include homicidal or suicidal ideation.           ED Course & MDM   Diagnoses as of 03/27/25 1631   Feared condition not demonstrated   Racism                 No data recorded     [place] Coma Scale Score: 14 (03/25/25 2206 : Zina Ac RN)                           Medical Decision Making  Patient is an 84-year-old female presents emergency department for evaluation after having increased agitation at [facility] where she resides with history of dementia with history of agitation.    Labwork and scans not warranted at today's visit.    Medications not given at today's visit.    I saw this patient in conjunction with Dr. Lawrence.  Patient remains calm cooperative and not agitated while in the emergency department.  She has no medical complaints at today's visit and admits that she states that she just wanted to get out of the facility and that is why she request to be taken to the hospital.  She has no medical complaints and no thoughts of wanting to hurt  her self or others.  She is not warrant any lab work or imaging at this time and is stable to return back to her facility for further care of her chronic dementia with behavioral disturbances.  No indication for psychiatric valuation for [facility] placement at this is patient's baseline.  Emergent pathologies were considered for this patient, although I have low suspicion for anything acutely emergent given patient's clinical presentation, history, physical exam, stable vital signs.  Discharging patient home is reasonable plan of care for outpatient management.    Patient was counseled on clinical impression, expectations, and plan.  Patient was educated to follow-up with PCP in the following 1-2 days.  All questions from patient were answered. They elicited understanding and were agreeable to course of treatment.  Patient was discharged in stable condition and given strict return precautions.    ** Disclaimer:  Parts of this document were written utilizing a voice to text dictation software.  Note may contain minor transcription or typographical errors that were inadvertently transcribed by the computer software.        Procedure  Procedures     Yvette Lopez PA-C  03/27/25 6294

## 2025-03-26 NOTE — ED PROVIDER NOTES
THIS IS MY ROBERTH SUPERVISORY AND SHARED VISIT NOTE:    I personally saw the patient and made/approved the management plan and take responsibility for the patient management.    History: Patient is a 84-year-old female presenting the chief complaint of psychiatric evaluation.  Patient is coming from her long-term care facility, she has a history of dementia and behavioral disturbances, patient states that she was tired to be in the facility today, trying to find ways to get out of the facility, patient states that she is very upset as her multiple individuals in [place] nationality, she does not agree with them, she states they sent her here for evaluation, patient told EMS that she knew if she acted this way to be able to get out of the facility and coming to the emergency department, patient with no complaints at this time, feels well, no thoughts of greater self or others, patient has no other acute complaints at this time.    Exam: GENERAL APPEARANCE: Awake and alert.     HEENT: Normocephalic, atraumatic. Extraocular muscles are intact. Pupils equal round and reactive to light.  CHEST: Nontender to palpation. Clear to auscultation bilaterally. No rales, rhonchi, or wheezing.   HEART: S1, S2. Regular rate and rhythm. No murmurs, gallops or rubs.  Strong and equal pulses in the extremities.   ABDOMEN: Soft,.  non-tender.  No rebound or guarding, bowel sounds normal x 4 quadrants  NEUROLOGICAL: Awake, alert and oriented x 2      MDM: Patient seen and evaluated at bedside, patient is in no acute distress.  Patient has no acute concerns on examination, patient is able to voice that she understood that by making it, she did should be sent out of the nursing home, besides emergency department, this was her plan along, patiently discharged back to her facility.    Diagnosis: Urination not demonstrated,  Please see ROBERTH note for further details    Sections of this report were created using voice-to-text technology and may  contain errors in translation    James Lawrence DO  Emergency Medicine       James Lawrence DO  03/27/25 2034

## 2025-03-29 ASSESSMENT — ENCOUNTER SYMPTOMS
BACK PAIN: 0
NERVOUS/ANXIOUS: 0
AGITATION: 0
CONSTIPATION: 0
WOUND: 1
ABDOMINAL PAIN: 0
DIARRHEA: 0
NAUSEA: 0
UNEXPECTED WEIGHT CHANGE: 0
SHORTNESS OF BREATH: 0
SORE THROAT: 0
SEIZURES: 0
VOMITING: 0
FREQUENCY: 0
HEADACHES: 0
DIZZINESS: 0
ARTHRALGIAS: 1
FEVER: 0
DIFFICULTY URINATING: 0
PALPITATIONS: 0

## 2025-03-29 NOTE — PROGRESS NOTES
Subjective   Patient ID: Sarika Rivero is a 84 y.o. female who is long term resident being seen and evaluated for multiple medical problems.    Alert,  anxious, she is noted to have increased redness and excoriations of the tip of her nose, she picks at the skin frequently.  She usually ambulates with walker or independently short distance.  Appetite okay.  She is compliant with medication.          Review of Systems   Constitutional:  Negative for fever and unexpected weight change.   HENT:  Negative for sore throat.    Respiratory:  Negative for shortness of breath.    Cardiovascular:  Negative for chest pain and palpitations.   Gastrointestinal:  Negative for abdominal pain, constipation, diarrhea, nausea and vomiting.   Genitourinary:  Negative for difficulty urinating and frequency.   Musculoskeletal:  Positive for arthralgias. Negative for back pain.   Skin:  Positive for rash and wound (On the nose).   Neurological:  Negative for dizziness, seizures and headaches.   Psychiatric/Behavioral:  Negative for agitation. The patient is not nervous/anxious.        Objective   There were no vitals taken for this visit.    Physical Exam  Vitals reviewed.   Constitutional:       General: She is not in acute distress.  HENT:      Nose:      Comments: Redness and excoriations of tip of her nose     Mouth/Throat:      Mouth: Mucous membranes are moist.   Cardiovascular:      Rate and Rhythm: Regular rhythm.      Heart sounds: Normal heart sounds.   Pulmonary:      Breath sounds: Normal breath sounds. No rales.   Abdominal:      Palpations: Abdomen is soft.      Tenderness: There is no abdominal tenderness.   Musculoskeletal:         General: No tenderness (With range of motion of left knee).      Cervical back: Neck supple. No tenderness.   Skin:     Findings: Erythema (Excoriations of the tip of the nose, with erythema slight swelling) present.      Comments: Ecchymosis of right lateral thigh   Neurological:       Mental Status: She is alert.         Assessment/Plan   Problem List Items Addressed This Visit       Alzheimer's disease with late onset (Multi)    Osteoarthritis, generalized     Other Visit Diagnoses       Cellulitis of nose, external    -  Primary                                     Goals    None     Will start doxycycline and mupirocin topical, psychiatric follow-up